# Patient Record
Sex: FEMALE | Race: WHITE | Employment: PART TIME | URBAN - METROPOLITAN AREA
[De-identification: names, ages, dates, MRNs, and addresses within clinical notes are randomized per-mention and may not be internally consistent; named-entity substitution may affect disease eponyms.]

---

## 2023-03-11 ENCOUNTER — HOSPITAL ENCOUNTER (EMERGENCY)
Age: 38
Discharge: HOME | End: 2023-03-11
Attending: EMERGENCY MEDICINE
Payer: MEDICAID

## 2023-03-11 VITALS
OXYGEN SATURATION: 97 % | RESPIRATION RATE: 20 BRPM | DIASTOLIC BLOOD PRESSURE: 90 MMHG | TEMPERATURE: 99.1 F | BODY MASS INDEX: 28.27 KG/M2 | WEIGHT: 140.21 LBS | HEIGHT: 59 IN | HEART RATE: 106 BPM | SYSTOLIC BLOOD PRESSURE: 158 MMHG

## 2023-03-11 DIAGNOSIS — G44.209 ACUTE NON INTRACTABLE TENSION-TYPE HEADACHE: ICD-10-CM

## 2023-03-11 DIAGNOSIS — R82.90 ABNORMAL URINALYSIS: ICD-10-CM

## 2023-03-11 DIAGNOSIS — R73.9 HYPERGLYCEMIA: ICD-10-CM

## 2023-03-11 DIAGNOSIS — R11.10 ACUTE VOMITING: Primary | ICD-10-CM

## 2023-03-11 DIAGNOSIS — R10.9 ACUTE ABDOMINAL PAIN: ICD-10-CM

## 2023-03-11 LAB
ALBUMIN SERPL-MCNC: 3.8 G/DL (ref 3.5–5)
ALBUMIN/GLOB SERPL: 0.9 (ref 1.1–2.2)
ALP SERPL-CCNC: 138 U/L (ref 45–117)
ALT SERPL-CCNC: 32 U/L (ref 12–78)
ANION GAP SERPL CALC-SCNC: 7 MMOL/L (ref 5–15)
APPEARANCE UR: CLEAR
AST SERPL-CCNC: 17 U/L (ref 15–37)
BACTERIA URNS QL MICRO: ABNORMAL /HPF
BASOPHILS # BLD: 0.1 K/UL (ref 0–0.1)
BASOPHILS NFR BLD: 1 % (ref 0–1)
BILIRUB SERPL-MCNC: 1.5 MG/DL (ref 0.2–1)
BILIRUB UR QL: NEGATIVE
BUN SERPL-MCNC: 8 MG/DL (ref 6–20)
BUN/CREAT SERPL: 12 (ref 12–20)
CALCIUM SERPL-MCNC: 9.3 MG/DL (ref 8.5–10.1)
CHLORIDE SERPL-SCNC: 104 MMOL/L (ref 97–108)
CO2 SERPL-SCNC: 22 MMOL/L (ref 21–32)
COLOR UR: ABNORMAL
CREAT SERPL-MCNC: 0.66 MG/DL (ref 0.55–1.02)
DIFFERENTIAL METHOD BLD: ABNORMAL
EOSINOPHIL # BLD: 0.2 K/UL (ref 0–0.4)
EOSINOPHIL NFR BLD: 2 % (ref 0–7)
EPITH CASTS URNS QL MICRO: ABNORMAL /LPF
ERYTHROCYTE [DISTWIDTH] IN BLOOD BY AUTOMATED COUNT: 12.5 % (ref 11.5–14.5)
GLOBULIN SER CALC-MCNC: 4.3 G/DL (ref 2–4)
GLUCOSE SERPL-MCNC: 303 MG/DL (ref 65–100)
GLUCOSE UR STRIP.AUTO-MCNC: >1000 MG/DL
HCG UR QL: NEGATIVE
HCT VFR BLD AUTO: 39.5 % (ref 35–47)
HGB BLD-MCNC: 13.1 G/DL (ref 11.5–16)
HGB UR QL STRIP: NEGATIVE
HYALINE CASTS URNS QL MICRO: ABNORMAL /LPF (ref 0–5)
IMM GRANULOCYTES # BLD AUTO: 0 K/UL (ref 0–0.04)
IMM GRANULOCYTES NFR BLD AUTO: 0 % (ref 0–0.5)
KETONES UR QL STRIP.AUTO: 40 MG/DL
LEUKOCYTE ESTERASE UR QL STRIP.AUTO: NEGATIVE
LIPASE SERPL-CCNC: 84 U/L (ref 73–393)
LYMPHOCYTES # BLD: 2.4 K/UL (ref 0.8–3.5)
LYMPHOCYTES NFR BLD: 20 % (ref 12–49)
MCH RBC QN AUTO: 26.8 PG (ref 26–34)
MCHC RBC AUTO-ENTMCNC: 33.2 G/DL (ref 30–36.5)
MCV RBC AUTO: 80.9 FL (ref 80–99)
MONOCYTES # BLD: 0.8 K/UL (ref 0–1)
MONOCYTES NFR BLD: 7 % (ref 5–13)
NEUTS SEG # BLD: 8.8 K/UL (ref 1.8–8)
NEUTS SEG NFR BLD: 70 % (ref 32–75)
NITRITE UR QL STRIP.AUTO: NEGATIVE
NRBC # BLD: 0 K/UL (ref 0–0.01)
NRBC BLD-RTO: 0 PER 100 WBC
PH UR STRIP: 5.5 (ref 5–8)
PLATELET # BLD AUTO: 406 K/UL (ref 150–400)
PMV BLD AUTO: 9.6 FL (ref 8.9–12.9)
POTASSIUM SERPL-SCNC: 4 MMOL/L (ref 3.5–5.1)
PROT SERPL-MCNC: 8.1 G/DL (ref 6.4–8.2)
PROT UR STRIP-MCNC: 30 MG/DL
RBC # BLD AUTO: 4.88 M/UL (ref 3.8–5.2)
RBC #/AREA URNS HPF: ABNORMAL /HPF (ref 0–5)
SODIUM SERPL-SCNC: 133 MMOL/L (ref 136–145)
SP GR UR REFRACTOMETRY: 1.03 (ref 1–1.03)
UA: UC IF INDICATED,UAUC: ABNORMAL
UROBILINOGEN UR QL STRIP.AUTO: 0.2 EU/DL (ref 0.2–1)
WBC # BLD AUTO: 12.4 K/UL (ref 3.6–11)
WBC URNS QL MICRO: ABNORMAL /HPF (ref 0–4)

## 2023-03-11 PROCEDURE — 87086 URINE CULTURE/COLONY COUNT: CPT

## 2023-03-11 PROCEDURE — 96361 HYDRATE IV INFUSION ADD-ON: CPT

## 2023-03-11 PROCEDURE — 99284 EMERGENCY DEPT VISIT MOD MDM: CPT

## 2023-03-11 PROCEDURE — 83690 ASSAY OF LIPASE: CPT

## 2023-03-11 PROCEDURE — 81001 URINALYSIS AUTO W/SCOPE: CPT

## 2023-03-11 PROCEDURE — 74011250636 HC RX REV CODE- 250/636: Performed by: EMERGENCY MEDICINE

## 2023-03-11 PROCEDURE — 80053 COMPREHEN METABOLIC PANEL: CPT

## 2023-03-11 PROCEDURE — 85025 COMPLETE CBC W/AUTO DIFF WBC: CPT

## 2023-03-11 PROCEDURE — 74011250637 HC RX REV CODE- 250/637: Performed by: EMERGENCY MEDICINE

## 2023-03-11 PROCEDURE — 87147 CULTURE TYPE IMMUNOLOGIC: CPT

## 2023-03-11 PROCEDURE — 81025 URINE PREGNANCY TEST: CPT

## 2023-03-11 PROCEDURE — 96375 TX/PRO/DX INJ NEW DRUG ADDON: CPT

## 2023-03-11 PROCEDURE — 96374 THER/PROPH/DIAG INJ IV PUSH: CPT

## 2023-03-11 PROCEDURE — 36415 COLL VENOUS BLD VENIPUNCTURE: CPT

## 2023-03-11 RX ORDER — ONDANSETRON 2 MG/ML
4 INJECTION INTRAMUSCULAR; INTRAVENOUS
Status: COMPLETED | OUTPATIENT
Start: 2023-03-11 | End: 2023-03-11

## 2023-03-11 RX ORDER — BUTALBITAL, ACETAMINOPHEN AND CAFFEINE 300; 40; 50 MG/1; MG/1; MG/1
1 CAPSULE ORAL
Qty: 20 CAPSULE | Refills: 0 | Status: SHIPPED | OUTPATIENT
Start: 2023-03-11

## 2023-03-11 RX ORDER — DICYCLOMINE HYDROCHLORIDE 20 MG/1
20 TABLET ORAL EVERY 6 HOURS
Qty: 20 TABLET | Refills: 0 | Status: SHIPPED | OUTPATIENT
Start: 2023-03-11

## 2023-03-11 RX ORDER — ONDANSETRON 4 MG/1
4 TABLET, FILM COATED ORAL
Qty: 20 TABLET | Refills: 0 | Status: SHIPPED | OUTPATIENT
Start: 2023-03-11

## 2023-03-11 RX ORDER — KETOROLAC TROMETHAMINE 30 MG/ML
15 INJECTION, SOLUTION INTRAMUSCULAR; INTRAVENOUS
Status: COMPLETED | OUTPATIENT
Start: 2023-03-11 | End: 2023-03-11

## 2023-03-11 RX ORDER — BUTALBITAL, ACETAMINOPHEN AND CAFFEINE 50; 325; 40 MG/1; MG/1; MG/1
1 TABLET ORAL
Status: COMPLETED | OUTPATIENT
Start: 2023-03-11 | End: 2023-03-11

## 2023-03-11 RX ADMIN — BUTALBITAL, ACETAMINOPHEN, AND CAFFEINE 1 TABLET: 50; 325; 40 TABLET ORAL at 01:39

## 2023-03-11 RX ADMIN — KETOROLAC TROMETHAMINE 15 MG: 30 INJECTION, SOLUTION INTRAMUSCULAR at 00:54

## 2023-03-11 RX ADMIN — ONDANSETRON 4 MG: 2 INJECTION INTRAMUSCULAR; INTRAVENOUS at 00:55

## 2023-03-11 RX ADMIN — SODIUM CHLORIDE 1000 ML: 9 INJECTION, SOLUTION INTRAVENOUS at 00:55

## 2023-03-11 NOTE — ED NOTES
[facility] for the patient to return our call, office number was provided      HUB TO READ: Please provide the patient with the below provider comments.     ----- Message from Fernanda Carreon PA-C sent at 12/7/2021  2:28 PM EST -----  Mammogram looked good, no findings suspicious for malignancy.      ED staff reviewed discharge instructions with patient.

## 2023-03-11 NOTE — ED PROVIDER NOTES
EMERGENCY DEPARTMENT HISTORY AND PHYSICAL EXAM            Please note that this dictation was completed with the assistance of \"Dragon\", the computer voice recognition software. Quite often unanticipated grammatical, syntax, homophones, and other interpretive errors are inadvertently transcribed by the computer software. Please disregard these errors and any errors that have escaped final proofreading. Thank you. Date of Evaluation: 03/11/23  Patient: Phalen Shearouse  Patient Age and Sex: 40 y.o. female   MRN: [id number]  CSN: [id number]  PCP: None    History of Present Illness     Chief Complaint   Patient presents with    Vomiting     Patient ambulatory to triage w c/o vomiting onset a few days ago reports it is now making her head hurt. History Provided By: Patient/family/EMS (if available)    {HPI Limitations (Optional):77396}    HPI: Phalen Shearouse, 40 y.o. female with past medical history as documented below presents to the ED with c/o of ***. Pt denies any other exacerbating or ameliorating factors. There are no other complaints, changes or physical findings pertinent to the HPI at this time. Nursing Notes were all reviewed and agreed with or any disagreements were addressed in the HPI. Past History   Past Medical History:  History reviewed. No pertinent past medical history. Past Surgical History:  History reviewed. No pertinent surgical history. Family History:   Family history reviewed and was non-contributory, unless specified below:  Family History   Problem Relation Age of Onset    Hypertension Mother     Asthma Father     Lung Disease Father         COPD       Social History:  Social History     Tobacco Use    Smoking status: Never       Allergies:  No Known Allergies    Current Medications:  No current facility-administered medications on file prior to encounter. No current outpatient medications on file prior to encounter.        Review of Systems   A complete ROS was reviewed by me today and all other systems negative, unless otherwise specified below:  Review of Systems  Physical Exam   Patient Vitals for the past 24 hrs:   Temp Pulse Resp BP SpO2   03/11/23 0102 99.1 °F (37.3 °C) (!) 106 20 (!) 158/90 97 %   03/11/23 0023 99 °F (37.2 °C) (!) 114 -- (!) 182/111 99 %       Physical Exam  Diagnostic Studies     LABORATORY RESULTS:  I have personally reviewed and interpreted all available laboratory results. Recent Results (from the past 24 hour(s))   CBC WITH AUTOMATED DIFF    Collection Time: 03/11/23 12:36 AM   Result Value Ref Range    WBC 12.4 (H) 3.6 - 11.0 K/uL    RBC 4.88 3.80 - 5.20 M/uL    HGB 13.1 11.5 - 16.0 g/dL    HCT 39.5 35.0 - 47.0 %    MCV 80.9 80.0 - 99.0 FL    MCH 26.8 26.0 - 34.0 PG    MCHC 33.2 30.0 - 36.5 g/dL    RDW 12.5 11.5 - 14.5 %    PLATELET 603 (H) 753 - 400 K/uL    MPV 9.6 8.9 - 12.9 FL    NRBC 0.0 0  WBC    ABSOLUTE NRBC 0.00 0.00 - 0.01 K/uL    NEUTROPHILS 70 32 - 75 %    LYMPHOCYTES 20 12 - 49 %    MONOCYTES 7 5 - 13 %    EOSINOPHILS 2 0 - 7 %    BASOPHILS 1 0 - 1 %    IMMATURE GRANULOCYTES 0 0.0 - 0.5 %    ABS. NEUTROPHILS 8.8 (H) 1.8 - 8.0 K/UL    ABS. LYMPHOCYTES 2.4 0.8 - 3.5 K/UL    ABS. MONOCYTES 0.8 0.0 - 1.0 K/UL    ABS. EOSINOPHILS 0.2 0.0 - 0.4 K/UL    ABS. BASOPHILS 0.1 0.0 - 0.1 K/UL    ABS. IMM. GRANS. 0.0 0.00 - 0.04 K/UL    DF AUTOMATED     METABOLIC PANEL, COMPREHENSIVE    Collection Time: 03/11/23 12:36 AM   Result Value Ref Range    Sodium 133 (L) 136 - 145 mmol/L    Potassium 4.0 3.5 - 5.1 mmol/L    Chloride 104 97 - 108 mmol/L    CO2 22 21 - 32 mmol/L    Anion gap 7 5 - 15 mmol/L    Glucose 303 (H) 65 - 100 mg/dL    BUN 8 6 - 20 MG/DL    Creatinine 0.66 0.55 - 1.02 MG/DL    BUN/Creatinine ratio 12 12 - 20      eGFR >60 >60 ml/min/1.73m2    Calcium 9.3 8.5 - 10.1 MG/DL    Bilirubin, total 1.5 (H) 0.2 - 1.0 MG/DL    ALT (SGPT) 32 12 - 78 U/L    AST (SGOT) 17 15 - 37 U/L    Alk.  phosphatase 138 (H) 45 - 117 U/L Protein, total 8.1 6.4 - 8.2 g/dL    Albumin 3.8 3.5 - 5.0 g/dL    Globulin 4.3 (H) 2.0 - 4.0 g/dL    A-G Ratio 0.9 (L) 1.1 - 2.2     LIPASE    Collection Time: 03/11/23 12:36 AM   Result Value Ref Range    Lipase 84 73 - 393 U/L   URINALYSIS W/ REFLEX CULTURE    Collection Time: 03/11/23 12:36 AM    Specimen: Urine   Result Value Ref Range    Color YELLOW/STRAW      Appearance CLEAR CLEAR      Specific gravity 1.030 1.003 - 1.030      pH (UA) 5.5 5.0 - 8.0      Protein 30 (A) NEG mg/dL    Glucose >1,000 (A) NEG mg/dL    Ketone 40 (A) NEG mg/dL    Bilirubin Negative NEG      Blood Negative NEG      Urobilinogen 0.2 0.2 - 1.0 EU/dL    Nitrites Negative NEG      Leukocyte Esterase Negative NEG      WBC 10-20 0 - 4 /hpf    RBC 0-5 0 - 5 /hpf    Epithelial cells MODERATE (A) FEW /lpf    Bacteria 2+ (A) NEG /hpf    UA:UC IF INDICATED URINE CULTURE ORDERED (A) CNI      Hyaline cast 0-2 0 - 5 /lpf   HCG URINE, QL. - POC    Collection Time: 03/11/23 12:40 AM   Result Value Ref Range    Pregnancy test,urine (POC) Negative NEG         RADIOLOGY RESULTS:  I have personally reviewed and interpreted all available imaging studies and agree with radiology interpretation. No orders to display     CT Results  (Last 48 hours)      None          CXR Results  (Last 48 hours)      None          No orders to display        [address]   I am the first and primary ED physician for this patient's ED visit today. I reviewed our EMR for any past records that may contribute to the patient's current condition, including their past medical, surgical, social and family history. This also includes their most recent ED visits, previous hospitalizations and prior diagnostic data. I have reviewed and summarized the most pertinent findings in my HPI and MDM.     Vital Signs Reviewed:  Patient Vitals for the past 24 hrs:   Temp Pulse Resp BP SpO2   03/11/23 0102 99.1 °F (37.3 °C) (!) 106 20 (!) 158/90 97 %   03/11/23 0023 99 °F (37.2 °C) (!) 114 -- (!) 182/111 99 %     Pulse Oximetry Analysis: ***% on RA with good pleth    Cardiac Monitor:   Rate: *** bpm  The cardiac monitor revealed the following rhythm as interpreted by me: Normal Sinus Rhythm  Cardiac monitoring was ordered to monitor patient for signs of cardiac dysrhythmia, which they are at risk for based on their history and/or risk for cardiovascular disease and/or metabolic abnormalities. EKG interpretation:   Billable EKG reviewed by ED Physician in the absence of a cardiologist: Yes  Rhythm: {[facility] ED EKG RHYTHM:63056}; and {[facility] EKG TUHW:19651}. Rate (approx.): ***; Axis: {[facility] EKG AXIS DEVIATION:64393}; P wave: {[facility] EKG P WAVE:51717}; QRS interval: {[facility] EKG QRS INTERVAL:53984}; ST/T wave: {[facility] EKG ST/T QDJA:27374}; Other findings: {[facility] EKG OTHER KWBTUBUZ:79187}. This EKG was interpreted by Marysol Fatima MD     Records Reviewed: Nursing Notes, Old Medical Records, Previous electrocardiograms, Previous Radiology Studies and Previous Laboratory Studies, EMS reports    DIFFERENTIAL DIAGNOSIS AND MDM:  ***    Pertinent Chronic Medical Conditions or Prior Surgeries: Listed under PMH above and includes:  History reviewed. No pertinent past medical history. ***  Review of Prior Records and External Documents:   reviewed: YES - I have reviewed the patient's controlled substance prescription history thru the Prescription Monitoring Program so that the prescription(s) for a controlled substance can be given. Prior hospital discharge summaries and clinic notes reviewed: ***    Independent History: An independent clinically history was obtained from ***family, EMS, police. They state ***    Social Determinants of Health:  Patients evaluation and management were significantly impacted by social determinants of health.      Social Determinants affecting Dx or Tx: {Social Barriers:21236}    ***  Social History     Socioeconomic History    Marital status: SINGLE   Tobacco Use    Smoking status: Never   Substance and Sexual Activity    Sexual activity: Yes     Birth control/protection: None     ED Course: Progress Notes, Reevaluation, and Consults:  Initial assessment performed. I discussed presenting problems and concerns, and my formulated plan for today's visit with the patient and any available family members. I have encouraged them to ask questions as they arise throughout the visit. ED Physician Orders:   Orders Placed This Encounter    CULTURE, URINE    CBC WITH AUTOMATED DIFF    METABOLIC PANEL, COMPREHENSIVE    LIPASE    URINALYSIS W/ REFLEX CULTURE    HCG URINE, QL. - POC    sodium chloride 0.9 % bolus infusion 1,000 mL    ondansetron (ZOFRAN) injection 4 mg    ketorolac (TORADOL) injection 15 mg    butalbital-acetaminophen-caffeine (FIORICET, ESGIC) -40 mg per tablet 1 Tablet    ondansetron hcl (Zofran) 4 mg tablet    butalbital-acetaminophen-caff (Fioricet) -40 mg per capsule    dicyclomine (BENTYL) 20 mg tablet     ED Medications Given:   Medications   sodium chloride 0.9 % bolus infusion 1,000 mL (0 mL IntraVENous IV Completed 3/11/23 0155)   ondansetron (ZOFRAN) injection 4 mg (4 mg IntraVENous Given 3/11/23 0055)   ketorolac (TORADOL) injection 15 mg (15 mg IntraVENous Given 3/11/23 0054)   butalbital-acetaminophen-caffeine (FIORICET, ESGIC) -40 mg per tablet 1 Tablet (1 Tablet Oral Given 3/11/23 0139)     Pt received IV/IM medications per above and placed on appropriate cardiac/respiratory monitoring due to drug toxicity. ED Physician Interpretation of Test Results:   All results were independently reviewed and interpreted by myself, notably showing:     RADIOLOGY:  Non-plain film images such as CT, ultrasound and MRI are read by the radiologist. Plain radiographic images are visualized and preliminarily interpreted by the ED Provider with the below findings:     Imaging interpreted by me: ***    Interpretation per the Radiologist below, if available at the time of this note:     No orders to display     CT Results  (Last 48 hours)      None          CXR Results  (Last 48 hours)      None          No orders to display     My interpretation of EKG: No STEMI. See my EKG interpretation in ED course above. My interpretation of laboratory results:   ***    Amount and/or Complexity of Data Reviewed  HIGH complexity decision making performed   Presentation: ACUTE and SEVERE  Clinical lab tests: ordered as appropriate & reviewed  Tests in the radiology section of CPT®: ordered as appropriate & reviewed  Tests in the medicine section of CPT®: ordered as appropriate & reviewed  Obtain history from someone other than the patient: yes  Review and summarize past medical records: yes  Independent visualization of images, tracings, or specimens: yes    Risks  OTC drugs. Prescription drug management. Parenteral controlled substances. Drug therapy requiring intensive monitoring for toxicity. Decision regarding hospitalization. ***    Progress Note:  I have just re-evaluated the patient. Pt reports improvement of her symptoms after ***. I have reviewed her vital signs and determined there is currently no worsening in their condition or physical exam. Results have been reviewed with them and their questions have been answered. I will continue to review further results as they come available. Progress Note:      Shared Decision Making: I considered performing *** , but did not after shared decision making with patient due to ***. Consideration for admission/observation for: ***. I engaged patient in shared decision making and she feels significant improvement after ED treatment and is comfortable with discharge with outpatient treatment and PCP/Specialist follow-up. ***  FINAL DIAGNOSIS   Clinical Impression:  1. Acute vomiting    2. Acute non intractable tension-type headache    3.  Acute abdominal pain      Attestation:  I am the attending of record for this patient. I personally performed the services described in this documentation on this date, 3/11/2023 for patient, Phalen Shearouse. I have reviewed the chart and verified that the record is accurate and complete.       Adina Cade MD (Electronic Signature) performing neuroimaging, but did not after shared decision making with patient due to lack of red flags or focal findings. No visual disturbances, numbness, focal weakness, or ataxia and has an otherwise non-focal neuro exam. Do not suspect intra-cranial pathology. We also reviewed labs and pt with mildly elevated T bili. Pt without abdominal pain. We decided to defer RUQ imaging and pt will follow-up with her PCP for this. Considered treating for UTI, but urinalysis with moderate epithelials and thus concern for contaminant. Reflex urine culture sent. DISCHARGE  Pt reassessed and symptoms noted to have improved significantly after ED treatment. Phalen Shearouse's labs and imaging have been reviewed with her and available family. She verbally conveys understanding and agreement of the signs, symptoms, diagnosis, treatment and prognosis and additionally agrees to follow up as recommended with Dr. None and/or specialist as instructed. She agrees with the care plan we have created and conveys that all of her questions have been answered. Additionally, I have put together a packet of discharge instructions for her that include: 1) Educational information regarding their diagnosis, 2) How to care for their diagnosis at home, as well a 3) List of reasons why they would want to return to the ED prior to their follow-up appointment should their condition change or symptoms worsen. I have answered all questions to the patient's satisfaction. Strict return precautions given. She and/or family conveyed understanding and agreement with care plan. Vital signs stable for discharge. PLAN  1. Return precautions as discussed with patient and available family/caregiver.      2.   Discharge Medication List as of 3/11/2023  2:20 AM        START taking these medications    Details   ondansetron hcl (Zofran) 4 mg tablet Take 1 Tablet by mouth every eight (8) hours as needed for Nausea or Vomiting., Normal, Disp-20 Tablet, R-0 butalbital-acetaminophen-caff (Fioricet) -40 mg per capsule Take 1 Capsule by mouth every four (4) hours as needed for Headache or Migraine. Indications: tension headache, Normal, Disp-20 Capsule, R-0      dicyclomine (BENTYL) 20 mg tablet Take 1 Tablet by mouth every six (6) hours. , Normal, Disp-20 Tablet, R-0           3. Follow-up Information       Follow up With Specialties Details Why Contact Info    [facility] EMERGENCY DEPT Emergency Medicine  As needed, If symptoms worsen [address] [place] [address] Drive  [address] [facility]  [phone]          Instructed to return to ED if worse  FINAL DIAGNOSIS   Clinical Impression:  1. Acute vomiting    2. Acute non intractable tension-type headache    3. Acute abdominal pain    4. Hyperglycemia    5. Abnormal urinalysis      Attestation:  I am the attending of record for this patient. I personally performed the services described in this documentation on this date, 3/11/2023 for patient, Phalen Shearouse. I have reviewed the chart and verified that the record is accurate and complete.       Alex Linton MD (Electronic Signature)

## 2023-03-11 NOTE — DISCHARGE INSTRUCTIONS
Thank You! It was a pleasure taking care of you in our Emergency Department today. We know that when you come to [facility], you are entrusting us with your health, comfort, and safety. Our physicians and nurses honor that trust, and truly appreciate the opportunity to care for you and your loved ones. We also value your feedback. If you receive a survey about your Emergency Department experience today, please fill it out. We care about our patients' feedback, and we listen to what you have to say. Thank you. Dr. Carrie Torres M.D.      ________________________________________________________________________  I have included a copy of your lab results and/or radiologic studies from today's visit so you can have them easily available at your follow-up visit. We hope you feel better and please do not hesitate to contact the ED if you have any questions at all! Recent Results (from the past 12 hour(s))   CBC WITH AUTOMATED DIFF    Collection Time: 03/11/23 12:36 AM   Result Value Ref Range    WBC 12.4 (H) 3.6 - 11.0 K/uL    RBC 4.88 3.80 - 5.20 M/uL    HGB 13.1 11.5 - 16.0 g/dL    HCT 39.5 35.0 - 47.0 %    MCV 80.9 80.0 - 99.0 FL    MCH 26.8 26.0 - 34.0 PG    MCHC 33.2 30.0 - 36.5 g/dL    RDW 12.5 11.5 - 14.5 %    PLATELET 150 (H) 988 - 400 K/uL    MPV 9.6 8.9 - 12.9 FL    NRBC 0.0 0  WBC    ABSOLUTE NRBC 0.00 0.00 - 0.01 K/uL    NEUTROPHILS 70 32 - 75 %    LYMPHOCYTES 20 12 - 49 %    MONOCYTES 7 5 - 13 %    EOSINOPHILS 2 0 - 7 %    BASOPHILS 1 0 - 1 %    IMMATURE GRANULOCYTES 0 0.0 - 0.5 %    ABS. NEUTROPHILS 8.8 (H) 1.8 - 8.0 K/UL    ABS. LYMPHOCYTES 2.4 0.8 - 3.5 K/UL    ABS. MONOCYTES 0.8 0.0 - 1.0 K/UL    ABS. EOSINOPHILS 0.2 0.0 - 0.4 K/UL    ABS. BASOPHILS 0.1 0.0 - 0.1 K/UL    ABS. IMM.  GRANS. 0.0 0.00 - 0.04 K/UL    DF AUTOMATED     METABOLIC PANEL, COMPREHENSIVE    Collection Time: 03/11/23 12:36 AM   Result Value Ref Range    Sodium 133 (L) 136 - 145 mmol/L    Potassium 4.0 3.5 - 5.1 mmol/L    Chloride 104 97 - 108 mmol/L    CO2 22 21 - 32 mmol/L    Anion gap 7 5 - 15 mmol/L    Glucose 303 (H) 65 - 100 mg/dL    BUN 8 6 - 20 MG/DL    Creatinine 0.66 0.55 - 1.02 MG/DL    BUN/Creatinine ratio 12 12 - 20      eGFR >60 >60 ml/min/1.73m2    Calcium 9.3 8.5 - 10.1 MG/DL    Bilirubin, total 1.5 (H) 0.2 - 1.0 MG/DL    ALT (SGPT) 32 12 - 78 U/L    AST (SGOT) 17 15 - 37 U/L    Alk. phosphatase 138 (H) 45 - 117 U/L    Protein, total 8.1 6.4 - 8.2 g/dL    Albumin 3.8 3.5 - 5.0 g/dL    Globulin 4.3 (H) 2.0 - 4.0 g/dL    A-G Ratio 0.9 (L) 1.1 - 2.2     LIPASE    Collection Time: 03/11/23 12:36 AM   Result Value Ref Range    Lipase 84 73 - 393 U/L   URINALYSIS W/ REFLEX CULTURE    Collection Time: 03/11/23 12:36 AM    Specimen: Urine   Result Value Ref Range    Color YELLOW/STRAW      Appearance CLEAR CLEAR      Specific gravity 1.030 1.003 - 1.030      pH (UA) 5.5 5.0 - 8.0      Protein 30 (A) NEG mg/dL    Glucose >1,000 (A) NEG mg/dL    Ketone 40 (A) NEG mg/dL    Bilirubin Negative NEG      Blood Negative NEG      Urobilinogen 0.2 0.2 - 1.0 EU/dL    Nitrites Negative NEG      Leukocyte Esterase Negative NEG      WBC 10-20 0 - 4 /hpf    RBC 0-5 0 - 5 /hpf    Epithelial cells MODERATE (A) FEW /lpf    Bacteria 2+ (A) NEG /hpf    UA:UC IF INDICATED URINE CULTURE ORDERED (A) CNI      Hyaline cast 0-2 0 - 5 /lpf   HCG URINE, QL. - POC    Collection Time: 03/11/23 12:40 AM   Result Value Ref Range    Pregnancy test,urine (POC) Negative NEG         No orders to display     CT Results  (Last 48 hours)      None          The exam and treatment you received in the Emergency Department were for an urgent problem and are not intended as complete care. It is important that you follow up with a doctor, nurse practitioner, or physician assistant for ongoing care.  If your symptoms become worse or you do not improve as expected and you are unable to reach your usual health care provider, you should return to the Emergency Department. We are available 24 hours a day. Please take your discharge instructions with you when you go to your follow-up appointment. If a prescription has been provided, please have it filled as soon as possible to prevent a delay in treatment. Read the entire medication instruction sheet provided to you by the pharmacy. If you have any questions or reservations about taking the medication due to side effects or interactions with other medications, please call your primary care physician or contact the ER to speak with the charge nurse. Please make an appointment with your family doctor or the physician you were referred to for follow-up of this visit as instructed on your discharge paperwork. Return to the ER if you are unable to be seen or if you are unable to be seen in a timely manner. Should you experience abdominal pain lasting greater than 6 hours, chest pain, difficulty breathing, fever/chills, numbness/tingling, skin changes or other symptoms that concern you, return to the ED sooner. If you feel worse over the next 24 hours, please return to the ED. We are available 24 hours a day. Thank you for trusting us with your care!

## 2023-03-12 LAB
BACTERIA SPEC CULT: ABNORMAL
CC UR VC: ABNORMAL
SERVICE CMNT-IMP: ABNORMAL

## 2023-03-28 PROBLEM — E11.69 HYPERLIPIDEMIA ASSOCIATED WITH TYPE 2 DIABETES MELLITUS (HCC): Status: ACTIVE | Noted: 2023-03-28

## 2023-03-28 PROBLEM — E78.5 HYPERLIPIDEMIA ASSOCIATED WITH TYPE 2 DIABETES MELLITUS (HCC): Status: ACTIVE | Noted: 2023-03-28

## 2023-03-28 PROBLEM — E11.69 TYPE 2 DIABETES MELLITUS WITH OTHER SPECIFIED COMPLICATION (HCC): Status: ACTIVE | Noted: 2023-03-28

## 2023-03-28 PROBLEM — E11.9 HYPERTENSION COMPLICATING DIABETES (HCC): Status: ACTIVE | Noted: 2023-03-28

## 2023-03-28 PROBLEM — I10 HYPERTENSION COMPLICATING DIABETES (HCC): Status: ACTIVE | Noted: 2023-03-28

## 2023-05-24 ENCOUNTER — HOSPITAL ENCOUNTER (EMERGENCY)
Facility: HOSPITAL | Age: 38
Discharge: HOME | End: 2023-05-25
Attending: EMERGENCY MEDICINE
Payer: MEDICAID

## 2023-05-24 VITALS
SYSTOLIC BLOOD PRESSURE: 163 MMHG | BODY MASS INDEX: 27.33 KG/M2 | WEIGHT: 135.58 LBS | RESPIRATION RATE: 16 BRPM | OXYGEN SATURATION: 97 % | DIASTOLIC BLOOD PRESSURE: 111 MMHG | HEIGHT: 59 IN | TEMPERATURE: 98 F | HEART RATE: 114 BPM

## 2023-05-24 DIAGNOSIS — J01.00 ACUTE NON-RECURRENT MAXILLARY SINUSITIS: Primary | ICD-10-CM

## 2023-05-24 PROCEDURE — 96374 THER/PROPH/DIAG INJ IV PUSH: CPT | Performed by: EMERGENCY MEDICINE

## 2023-05-24 PROCEDURE — 6360000002 HC RX W HCPCS: Performed by: EMERGENCY MEDICINE

## 2023-05-24 PROCEDURE — 99284 EMERGENCY DEPT VISIT MOD MDM: CPT | Performed by: EMERGENCY MEDICINE

## 2023-05-24 PROCEDURE — 6370000000 HC RX 637 (ALT 250 FOR IP): Performed by: EMERGENCY MEDICINE

## 2023-05-24 RX ORDER — GLIPIZIDE 10 MG/1
10 TABLET ORAL 2 TIMES DAILY
COMMUNITY
Start: 2023-03-24

## 2023-05-24 RX ORDER — ONDANSETRON 4 MG/1
4 TABLET, FILM COATED ORAL EVERY 8 HOURS PRN
COMMUNITY
Start: 2023-03-11

## 2023-05-24 RX ORDER — LISINOPRIL 20 MG/1
20 TABLET ORAL DAILY
COMMUNITY
Start: 2023-03-23

## 2023-05-24 RX ORDER — BUTALBITAL, ACETAMINOPHEN AND CAFFEINE 300; 40; 50 MG/1; MG/1; MG/1
1 CAPSULE ORAL EVERY 4 HOURS PRN
COMMUNITY
Start: 2023-03-11

## 2023-05-24 RX ORDER — IBUPROFEN 600 MG/1
600 TABLET ORAL EVERY 6 HOURS PRN
Qty: 20 TABLET | Refills: 1 | Status: SHIPPED | OUTPATIENT
Start: 2023-05-24 | End: 2023-06-03

## 2023-05-24 RX ORDER — OXYMETAZOLINE HYDROCHLORIDE 0.05 G/100ML
2 SPRAY NASAL
Status: COMPLETED | OUTPATIENT
Start: 2023-05-24 | End: 2023-05-24

## 2023-05-24 RX ORDER — ROSUVASTATIN CALCIUM 20 MG/1
1 TABLET, COATED ORAL NIGHTLY
COMMUNITY
Start: 2023-03-24

## 2023-05-24 RX ORDER — KETOROLAC TROMETHAMINE 30 MG/ML
30 INJECTION, SOLUTION INTRAMUSCULAR; INTRAVENOUS
Status: COMPLETED | OUTPATIENT
Start: 2023-05-24 | End: 2023-05-24

## 2023-05-24 RX ORDER — DICYCLOMINE HCL 20 MG
20 TABLET ORAL EVERY 6 HOURS
COMMUNITY
Start: 2023-03-11

## 2023-05-24 RX ORDER — DULAGLUTIDE 3 MG/.5ML
3 INJECTION, SOLUTION SUBCUTANEOUS
COMMUNITY
Start: 2023-03-24

## 2023-05-24 RX ADMIN — OXYMETAZOLINE HCL 2 SPRAY: 0.05 SPRAY NASAL at 23:02

## 2023-05-24 RX ADMIN — KETOROLAC TROMETHAMINE 30 MG: 30 INJECTION, SOLUTION INTRAMUSCULAR; INTRAVENOUS at 23:02

## 2023-05-24 ASSESSMENT — PAIN DESCRIPTION - DESCRIPTORS: DESCRIPTORS: PRESSURE

## 2023-05-24 ASSESSMENT — ENCOUNTER SYMPTOMS
SHORTNESS OF BREATH: 0
SINUS PRESSURE: 1
SINUS PAIN: 1
TROUBLE SWALLOWING: 0
COUGH: 0
SORE THROAT: 0

## 2023-05-24 ASSESSMENT — PAIN DESCRIPTION - ORIENTATION: ORIENTATION: MID;ANTERIOR

## 2023-05-24 ASSESSMENT — PAIN DESCRIPTION - LOCATION: LOCATION: HEAD

## 2023-05-24 ASSESSMENT — PAIN SCALES - GENERAL: PAINLEVEL_OUTOF10: 8

## 2023-05-25 NOTE — ED NOTES
Cefuroxime 250mg twice daily was given yesterday at [facility] for sinus infection.  Pt reports getting worse after taking the medication     Shailesh Farfan RN  05/24/23 4204

## 2023-05-25 NOTE — ED PROVIDER NOTES
EMERGENCY DEPARTMENT HISTORY AND PHYSICAL EXAM    Date: 5/24/2023  Patient Name: Shoaib Nolasco  Patient Age and Sex: 41 y.o. female  MRN:  [id number]  CSN:  [id number]    History of Present Illness     Chief Complaint   Patient presents with    Sinusitis     Patient arrives ambulatory to triage with complaint of sinus infection for several days and was seen at [facility] yesterday. Patient was placed on antibiotic yesterday and reports increasing pain and pressure today. Patient tested negative for strep and covid       History Provided By: Patient    Ability to gather history was limited by:     HPI: Phalen Shearouse, 41 y.o. female   Complains of sinus pressure bilaterally for the past few days. She went to an urgent care yesterday and was prescribed cefuroxime antibiotic. Pain is described as pressure-like and burning, moderate severity. She tested negative for Streptococcus and COVID. No reported fevers or headaches. No sore throat. Tobacco Use      Smoking status: Never      Smokeless tobacco: Never     Past History   The patient's medical, surgical, and social history were reviewed by me today. Current Medications:  No current facility-administered medications on file prior to encounter. Current Outpatient Medications on File Prior to Encounter   Medication Sig Dispense Refill    butalbital-APAP-caffeine -40 MG CAPS per capsule Take 1 capsule by mouth every 4 hours as needed      dicyclomine (BENTYL) 20 MG tablet Take 1 tablet by mouth in the morning and 1 tablet at noon and 1 tablet in the evening and 1 tablet before bedtime.       Dulaglutide (TRULICITY) 3 ZT/9.6HU SOPN Inject 3 mg into the skin every 7 days      glipiZIDE (GLUCOTROL) 10 MG tablet Take 1 tablet by mouth 2 times daily      lisinopril (PRINIVIL;ZESTRIL) 20 MG tablet Take 1 tablet by mouth daily      metFORMIN (GLUCOPHAGE) 1000 MG tablet Take 1 tablet by mouth 2 times daily (with meals)      ondansetron (ZOFRAN) 4

## 2023-05-25 NOTE — ED NOTES
Discharge instructions given to patient by MD Burgess Dc. Verbalized understanding of instructions. Patient discharged without difficulty. Patient discharged in stable condition via ambulation accompanied by self.        Robyn Stevenson RN  05/25/23 0672

## 2025-01-07 ENCOUNTER — HOSPITAL ENCOUNTER (EMERGENCY)
Facility: HOSPITAL | Age: 40
Discharge: HOME | End: 2025-01-07
Attending: EMERGENCY MEDICINE
Payer: MEDICAID

## 2025-01-07 VITALS
WEIGHT: 131.61 LBS | OXYGEN SATURATION: 97 % | RESPIRATION RATE: 16 BRPM | HEART RATE: 115 BPM | TEMPERATURE: 100.2 F | HEIGHT: 59 IN | BODY MASS INDEX: 26.53 KG/M2 | SYSTOLIC BLOOD PRESSURE: 155 MMHG | DIASTOLIC BLOOD PRESSURE: 83 MMHG

## 2025-01-07 DIAGNOSIS — R11.2 NAUSEA AND VOMITING, UNSPECIFIED VOMITING TYPE: Primary | ICD-10-CM

## 2025-01-07 LAB
ALBUMIN SERPL-MCNC: 3.7 G/DL (ref 3.5–5)
ALBUMIN/GLOB SERPL: 0.8 (ref 1.1–2.2)
ALP SERPL-CCNC: 260 U/L (ref 45–117)
ALT SERPL-CCNC: 75 U/L (ref 12–78)
ANION GAP BLD CALC-SCNC: 7 (ref 10–20)
ANION GAP SERPL CALC-SCNC: 9 MMOL/L (ref 2–12)
APPEARANCE UR: CLEAR
AST SERPL-CCNC: 55 U/L (ref 15–37)
BACTERIA URNS QL MICRO: NEGATIVE /HPF
BASE DEFICIT BLD-SCNC: 1.9 MMOL/L
BASOPHILS # BLD: 0.18 K/UL (ref 0–0.1)
BASOPHILS NFR BLD: 1 % (ref 0–1)
BILIRUB SERPL-MCNC: 2 MG/DL (ref 0.2–1)
BILIRUB UR QL: NEGATIVE
BUN SERPL-MCNC: 10 MG/DL (ref 6–20)
BUN/CREAT SERPL: 13 (ref 12–20)
CA-I BLD-MCNC: 1.18 MMOL/L (ref 1.15–1.33)
CALCIUM SERPL-MCNC: 9.3 MG/DL (ref 8.5–10.1)
CHLORIDE BLD-SCNC: 104 MMOL/L (ref 100–111)
CHLORIDE SERPL-SCNC: 102 MMOL/L (ref 97–108)
CO2 BLD-SCNC: 23 MMOL/L (ref 22–29)
CO2 SERPL-SCNC: 21 MMOL/L (ref 21–32)
COLOR UR: ABNORMAL
CREAT SERPL-MCNC: 0.77 MG/DL (ref 0.55–1.02)
CREAT UR-MCNC: 0.5 MG/DL (ref 0.6–1.3)
DIFFERENTIAL METHOD BLD: ABNORMAL
EOSINOPHIL # BLD: 0 K/UL (ref 0–0.4)
EOSINOPHIL NFR BLD: 0 % (ref 0–7)
EPITH CASTS URNS QL MICRO: ABNORMAL /LPF
ERYTHROCYTE [DISTWIDTH] IN BLOOD BY AUTOMATED COUNT: 12.6 % (ref 11.5–14.5)
FLUAV RNA SPEC QL NAA+PROBE: NOT DETECTED
FLUBV RNA SPEC QL NAA+PROBE: NOT DETECTED
GLOBULIN SER CALC-MCNC: 4.6 G/DL (ref 2–4)
GLUCOSE BLD STRIP.AUTO-MCNC: 387 MG/DL (ref 74–99)
GLUCOSE SERPL-MCNC: 330 MG/DL (ref 65–100)
GLUCOSE UR STRIP.AUTO-MCNC: >1000 MG/DL
HCG UR QL: NEGATIVE
HCO3 BLDA-SCNC: 24 MMOL/L
HCT VFR BLD AUTO: 36.5 % (ref 35–47)
HGB BLD-MCNC: 12.2 G/DL (ref 11.5–16)
HGB UR QL STRIP: ABNORMAL
HYALINE CASTS URNS QL MICRO: ABNORMAL /LPF (ref 0–2)
IMM GRANULOCYTES # BLD AUTO: 0.18 K/UL (ref 0–0.04)
IMM GRANULOCYTES NFR BLD AUTO: 1 % (ref 0–0.5)
KETONES UR QL STRIP.AUTO: >80 MG/DL
LEUKOCYTE ESTERASE UR QL STRIP.AUTO: NEGATIVE
LIPASE SERPL-CCNC: 16 U/L (ref 13–75)
LYMPHOCYTES # BLD: 0.72 K/UL (ref 0.8–3.5)
LYMPHOCYTES NFR BLD: 4 % (ref 12–49)
MCH RBC QN AUTO: 26.8 PG (ref 26–34)
MCHC RBC AUTO-ENTMCNC: 33.4 G/DL (ref 30–36.5)
MCV RBC AUTO: 80 FL (ref 80–99)
MONOCYTES # BLD: 1.09 K/UL (ref 0–1)
MONOCYTES NFR BLD: 6 % (ref 5–13)
NEUTS SEG # BLD: 15.93 K/UL (ref 1.8–8)
NEUTS SEG NFR BLD: 88 % (ref 32–75)
NITRITE UR QL STRIP.AUTO: NEGATIVE
NRBC # BLD: 0 K/UL (ref 0–0.01)
NRBC BLD-RTO: 0 PER 100 WBC
PCO2 BLDV: 41.2 MMHG (ref 41–51)
PH BLDV: 7.36 (ref 7.32–7.42)
PH UR STRIP: 5.5 (ref 5–8)
PLATELET # BLD AUTO: 354 K/UL (ref 150–400)
PMV BLD AUTO: 9.8 FL (ref 8.9–12.9)
PO2 BLDV: <27 MMHG (ref 25–40)
POTASSIUM BLD-SCNC: 5.4 MMOL/L (ref 3.5–5.5)
POTASSIUM SERPL-SCNC: 4.7 MMOL/L (ref 3.5–5.1)
PROT SERPL-MCNC: 8.3 G/DL (ref 6.4–8.2)
PROT UR STRIP-MCNC: 30 MG/DL
RBC # BLD AUTO: 4.56 M/UL (ref 3.8–5.2)
RBC #/AREA URNS HPF: ABNORMAL /HPF (ref 0–5)
RBC MORPH BLD: ABNORMAL
SARS-COV-2 RNA RESP QL NAA+PROBE: NOT DETECTED
SODIUM BLD-SCNC: 134 MMOL/L (ref 136–145)
SODIUM SERPL-SCNC: 132 MMOL/L (ref 136–145)
SOURCE: NORMAL
SP GR UR REFRACTOMETRY: 1.02 (ref 1–1.03)
SPECIMEN SITE: ABNORMAL
URINE CULTURE IF INDICATED: ABNORMAL
UROBILINOGEN UR QL STRIP.AUTO: 0.2 EU/DL (ref 0.2–1)
WBC # BLD AUTO: 18.1 K/UL (ref 3.6–11)
WBC URNS QL MICRO: ABNORMAL /HPF (ref 0–4)

## 2025-01-07 PROCEDURE — 36415 COLL VENOUS BLD VENIPUNCTURE: CPT

## 2025-01-07 PROCEDURE — 6360000002 HC RX W HCPCS: Performed by: EMERGENCY MEDICINE

## 2025-01-07 PROCEDURE — 2580000003 HC RX 258: Performed by: EMERGENCY MEDICINE

## 2025-01-07 PROCEDURE — 81001 URINALYSIS AUTO W/SCOPE: CPT

## 2025-01-07 PROCEDURE — 80053 COMPREHEN METABOLIC PANEL: CPT

## 2025-01-07 PROCEDURE — 99284 EMERGENCY DEPT VISIT MOD MDM: CPT

## 2025-01-07 PROCEDURE — 82947 ASSAY GLUCOSE BLOOD QUANT: CPT

## 2025-01-07 PROCEDURE — 84295 ASSAY OF SERUM SODIUM: CPT

## 2025-01-07 PROCEDURE — 81025 URINE PREGNANCY TEST: CPT

## 2025-01-07 PROCEDURE — 87636 SARSCOV2 & INF A&B AMP PRB: CPT

## 2025-01-07 PROCEDURE — 96374 THER/PROPH/DIAG INJ IV PUSH: CPT

## 2025-01-07 PROCEDURE — 82803 BLOOD GASES ANY COMBINATION: CPT

## 2025-01-07 PROCEDURE — 82330 ASSAY OF CALCIUM: CPT

## 2025-01-07 PROCEDURE — 85025 COMPLETE CBC W/AUTO DIFF WBC: CPT

## 2025-01-07 PROCEDURE — 84132 ASSAY OF SERUM POTASSIUM: CPT

## 2025-01-07 PROCEDURE — 83690 ASSAY OF LIPASE: CPT

## 2025-01-07 RX ORDER — 0.9 % SODIUM CHLORIDE 0.9 %
1000 INTRAVENOUS SOLUTION INTRAVENOUS ONCE
Status: COMPLETED | OUTPATIENT
Start: 2025-01-07 | End: 2025-01-07

## 2025-01-07 RX ORDER — ONDANSETRON 4 MG/1
4 TABLET, ORALLY DISINTEGRATING ORAL 3 TIMES DAILY PRN
Qty: 21 TABLET | Refills: 0 | Status: SHIPPED | OUTPATIENT
Start: 2025-01-07

## 2025-01-07 RX ORDER — ONDANSETRON 2 MG/ML
4 INJECTION INTRAMUSCULAR; INTRAVENOUS ONCE
Status: COMPLETED | OUTPATIENT
Start: 2025-01-07 | End: 2025-01-07

## 2025-01-07 RX ADMIN — SODIUM CHLORIDE 1000 ML: 9 INJECTION, SOLUTION INTRAVENOUS at 18:23

## 2025-01-07 RX ADMIN — ONDANSETRON 4 MG: 2 INJECTION INTRAMUSCULAR; INTRAVENOUS at 18:22

## 2025-01-07 ASSESSMENT — PAIN - FUNCTIONAL ASSESSMENT: PAIN_FUNCTIONAL_ASSESSMENT: 0-10

## 2025-01-07 ASSESSMENT — PAIN SCALES - GENERAL: PAINLEVEL_OUTOF10: 0

## 2025-01-07 ASSESSMENT — PAIN DESCRIPTION - PAIN TYPE: TYPE: ACUTE PAIN

## 2025-01-07 ASSESSMENT — PAIN DESCRIPTION - LOCATION: LOCATION: ABDOMEN

## 2025-01-07 ASSESSMENT — PAIN DESCRIPTION - DESCRIPTORS: DESCRIPTORS: ACHING

## 2025-01-07 ASSESSMENT — PAIN DESCRIPTION - ORIENTATION: ORIENTATION: MID

## 2025-01-08 NOTE — ED PROVIDER NOTES
MEDICATIONS:     Medication List        START taking these medications      ondansetron 4 MG disintegrating tablet  Commonly known as: ZOFRAN-ODT  Take 1 tablet by mouth 3 times daily as needed for Nausea or Vomiting            ASK your doctor about these medications      butalbital-APAP-caffeine -40 MG Caps per capsule     dicyclomine 20 MG tablet  Commonly known as: BENTYL     glipiZIDE 10 MG tablet  Commonly known as: GLUCOTROL     ibuprofen 600 MG tablet  Commonly known as: ADVIL;MOTRIN  Take 1 tablet by mouth every 6 hours as needed for Pain     lisinopril 20 MG tablet  Commonly known as: PRINIVIL;ZESTRIL     metFORMIN 1000 MG tablet  Commonly known as: GLUCOPHAGE     ondansetron 4 MG tablet  Commonly known as: ZOFRAN     rosuvastatin 20 MG tablet  Commonly known as: CRESTOR     SITagliptin 25 MG tablet  Commonly known as: JANUVIA     Trulicity 3 MG/0.5ML Sopn  Generic drug: Dulaglutide               Where to Get Your Medications        These medications were [facility] to [facility] [address] [place] [address] [facility]  - P [phone] - F [phone] [address] [place] [address]      Phone: [phone]   ondansetron 4 MG disintegrating tablet           DISCONTINUED MEDICATIONS:  Current Discharge Medication List          I am the Primary Clinician of Record.   Roberto Freitas DO (electronically signed)    (Please note that parts of this dictation were completed with voice recognition software. Quite often unanticipated grammatical, syntax, homophones, and other interpretive errors are inadvertently transcribed by the computer software. Please disregards these errors. Please excuse any errors that have escaped final proofreading.)          Roberto Freitas DO  01/07/25 2015

## 2025-01-13 ENCOUNTER — APPOINTMENT (OUTPATIENT)
Facility: HOSPITAL | Age: 40
End: 2025-01-13
Payer: MEDICAID

## 2025-01-13 ENCOUNTER — HOSPITAL ENCOUNTER (INPATIENT)
Facility: HOSPITAL | Age: 40
LOS: 3 days | Discharge: HOME | End: 2025-01-16
Attending: EMERGENCY MEDICINE | Admitting: STUDENT IN AN ORGANIZED HEALTH CARE EDUCATION/TRAINING PROGRAM
Payer: MEDICAID

## 2025-01-13 DIAGNOSIS — H72.91 OTITIS MEDIA, SEROUS, TM RUPTURE, RIGHT: ICD-10-CM

## 2025-01-13 DIAGNOSIS — E10.10 DIABETIC KETOACIDOSIS WITHOUT COMA ASSOCIATED WITH TYPE 1 DIABETES MELLITUS (HCC): Primary | ICD-10-CM

## 2025-01-13 DIAGNOSIS — H65.91 OTITIS MEDIA, SEROUS, TM RUPTURE, RIGHT: ICD-10-CM

## 2025-01-13 DIAGNOSIS — E11.65 TYPE 2 DIABETES MELLITUS WITH HYPERGLYCEMIA, WITHOUT LONG-TERM CURRENT USE OF INSULIN (HCC): ICD-10-CM

## 2025-01-13 PROBLEM — E11.10 DKA, TYPE 2, NOT AT GOAL (HCC): Status: ACTIVE | Noted: 2025-01-13

## 2025-01-13 LAB
ALBUMIN SERPL-MCNC: 2.7 G/DL (ref 3.5–5)
ALBUMIN/GLOB SERPL: 0.5 (ref 1.1–2.2)
ALP SERPL-CCNC: 243 U/L (ref 45–117)
ALT SERPL-CCNC: 14 U/L (ref 12–78)
ANION GAP SERPL CALC-SCNC: 20 MMOL/L (ref 2–12)
AST SERPL-CCNC: 5 U/L (ref 15–37)
BASE DEFICIT BLD-SCNC: 12.5 MMOL/L
BASOPHILS # BLD: 0.18 K/UL (ref 0–0.1)
BASOPHILS NFR BLD: 1 % (ref 0–1)
BILIRUB SERPL-MCNC: 0.9 MG/DL (ref 0.2–1)
BUN SERPL-MCNC: 11 MG/DL (ref 6–20)
BUN/CREAT SERPL: 13 (ref 12–20)
CALCIUM SERPL-MCNC: 9.4 MG/DL (ref 8.5–10.1)
CHLORIDE SERPL-SCNC: 97 MMOL/L (ref 97–108)
CO2 SERPL-SCNC: 12 MMOL/L (ref 21–32)
CREAT SERPL-MCNC: 0.85 MG/DL (ref 0.55–1.02)
DIFFERENTIAL METHOD BLD: ABNORMAL
EKG ATRIAL RATE: 119 BPM
EKG DIAGNOSIS: NORMAL
EKG P AXIS: 75 DEGREES
EKG P-R INTERVAL: 132 MS
EKG Q-T INTERVAL: 334 MS
EKG QRS DURATION: 64 MS
EKG QTC CALCULATION (BAZETT): 469 MS
EKG R AXIS: 78 DEGREES
EKG T AXIS: 71 DEGREES
EKG VENTRICULAR RATE: 119 BPM
EOSINOPHIL # BLD: 0 K/UL (ref 0–0.4)
EOSINOPHIL NFR BLD: 0 % (ref 0–7)
ERYTHROCYTE [DISTWIDTH] IN BLOOD BY AUTOMATED COUNT: 12.6 % (ref 11.5–14.5)
GLOBULIN SER CALC-MCNC: 5.1 G/DL (ref 2–4)
GLUCOSE BLD STRIP.AUTO-MCNC: 333 MG/DL (ref 65–117)
GLUCOSE BLD STRIP.AUTO-MCNC: 387 MG/DL (ref 65–117)
GLUCOSE SERPL-MCNC: 423 MG/DL (ref 65–100)
HCO3 BLD-SCNC: 12.2 MMOL/L (ref 21–28)
HCT VFR BLD AUTO: 40.4 % (ref 35–47)
HGB BLD-MCNC: 13.4 G/DL (ref 11.5–16)
IMM GRANULOCYTES # BLD AUTO: 0 K/UL (ref 0–0.04)
IMM GRANULOCYTES NFR BLD AUTO: 0 % (ref 0–0.5)
LACTATE BLD-SCNC: 1.56 MMOL/L (ref 0.4–2)
LIPASE SERPL-CCNC: 11 U/L (ref 13–75)
LYMPHOCYTES # BLD: 0.18 K/UL (ref 0.8–3.5)
LYMPHOCYTES NFR BLD: 1 % (ref 12–49)
MCH RBC QN AUTO: 26.7 PG (ref 26–34)
MCHC RBC AUTO-ENTMCNC: 33.2 G/DL (ref 30–36.5)
MCV RBC AUTO: 80.5 FL (ref 80–99)
METAMYELOCYTES NFR BLD MANUAL: 2 %
MONOCYTES # BLD: 1.44 K/UL (ref 0–1)
MONOCYTES NFR BLD: 8 % (ref 5–13)
NEUTS BAND NFR BLD MANUAL: 13 %
NEUTS SEG # BLD: 15.84 K/UL (ref 1.8–8)
NEUTS SEG NFR BLD: 75 % (ref 32–75)
NRBC # BLD: 0 K/UL (ref 0–0.01)
NRBC BLD-RTO: 0 PER 100 WBC
PCO2 BLD: 24.6 MMHG (ref 35–48)
PH BLD: 7.3 (ref 7.35–7.45)
PLATELET # BLD AUTO: 406 K/UL (ref 150–400)
PMV BLD AUTO: 9.4 FL (ref 8.9–12.9)
PO2 BLD: 31 MMHG (ref 83–108)
POTASSIUM SERPL-SCNC: 4 MMOL/L (ref 3.5–5.1)
PROT SERPL-MCNC: 7.8 G/DL (ref 6.4–8.2)
RBC # BLD AUTO: 5.02 M/UL (ref 3.8–5.2)
RBC MORPH BLD: ABNORMAL
SAO2 % BLD: 55.4 % (ref 92–97)
SERVICE CMNT-IMP: ABNORMAL
SODIUM SERPL-SCNC: 129 MMOL/L (ref 136–145)
SPECIMEN TYPE: ABNORMAL
WBC # BLD AUTO: 18 K/UL (ref 3.6–11)

## 2025-01-13 PROCEDURE — 82803 BLOOD GASES ANY COMBINATION: CPT

## 2025-01-13 PROCEDURE — 83690 ASSAY OF LIPASE: CPT

## 2025-01-13 PROCEDURE — 6360000002 HC RX W HCPCS: Performed by: STUDENT IN AN ORGANIZED HEALTH CARE EDUCATION/TRAINING PROGRAM

## 2025-01-13 PROCEDURE — 99285 EMERGENCY DEPT VISIT HI MDM: CPT

## 2025-01-13 PROCEDURE — 83605 ASSAY OF LACTIC ACID: CPT

## 2025-01-13 PROCEDURE — 71045 X-RAY EXAM CHEST 1 VIEW: CPT

## 2025-01-13 PROCEDURE — 6360000002 HC RX W HCPCS: Performed by: EMERGENCY MEDICINE

## 2025-01-13 PROCEDURE — 82010 KETONE BODYS QUAN: CPT

## 2025-01-13 PROCEDURE — 96375 TX/PRO/DX INJ NEW DRUG ADDON: CPT

## 2025-01-13 PROCEDURE — 2060000000 HC ICU INTERMEDIATE R&B

## 2025-01-13 PROCEDURE — 6370000000 HC RX 637 (ALT 250 FOR IP): Performed by: STUDENT IN AN ORGANIZED HEALTH CARE EDUCATION/TRAINING PROGRAM

## 2025-01-13 PROCEDURE — 36415 COLL VENOUS BLD VENIPUNCTURE: CPT

## 2025-01-13 PROCEDURE — 96374 THER/PROPH/DIAG INJ IV PUSH: CPT

## 2025-01-13 PROCEDURE — 2580000003 HC RX 258: Performed by: EMERGENCY MEDICINE

## 2025-01-13 PROCEDURE — 85025 COMPLETE CBC W/AUTO DIFF WBC: CPT

## 2025-01-13 PROCEDURE — 2580000003 HC RX 258: Performed by: STUDENT IN AN ORGANIZED HEALTH CARE EDUCATION/TRAINING PROGRAM

## 2025-01-13 PROCEDURE — 6360000004 HC RX CONTRAST MEDICATION: Performed by: EMERGENCY MEDICINE

## 2025-01-13 PROCEDURE — 80053 COMPREHEN METABOLIC PANEL: CPT

## 2025-01-13 PROCEDURE — 2500000003 HC RX 250 WO HCPCS: Performed by: EMERGENCY MEDICINE

## 2025-01-13 PROCEDURE — 74177 CT ABD & PELVIS W/CONTRAST: CPT

## 2025-01-13 PROCEDURE — 82962 GLUCOSE BLOOD TEST: CPT

## 2025-01-13 PROCEDURE — 93005 ELECTROCARDIOGRAM TRACING: CPT | Performed by: EMERGENCY MEDICINE

## 2025-01-13 RX ORDER — POLYETHYLENE GLYCOL 3350 17 G/17G
17 POWDER, FOR SOLUTION ORAL DAILY PRN
Status: DISCONTINUED | OUTPATIENT
Start: 2025-01-13 | End: 2025-01-16 | Stop reason: HOSPADM

## 2025-01-13 RX ORDER — SODIUM CHLORIDE 450 MG/100ML
INJECTION, SOLUTION INTRAVENOUS CONTINUOUS
Status: DISCONTINUED | OUTPATIENT
Start: 2025-01-13 | End: 2025-01-13

## 2025-01-13 RX ORDER — POTASSIUM CHLORIDE 7.45 MG/ML
10 INJECTION INTRAVENOUS PRN
Status: DISCONTINUED | OUTPATIENT
Start: 2025-01-13 | End: 2025-01-13

## 2025-01-13 RX ORDER — MAGNESIUM SULFATE IN WATER 40 MG/ML
2000 INJECTION, SOLUTION INTRAVENOUS PRN
Status: CANCELLED | OUTPATIENT
Start: 2025-01-13

## 2025-01-13 RX ORDER — MAGNESIUM SULFATE IN WATER 40 MG/ML
2000 INJECTION, SOLUTION INTRAVENOUS PRN
Status: DISCONTINUED | OUTPATIENT
Start: 2025-01-13 | End: 2025-01-13

## 2025-01-13 RX ORDER — POTASSIUM CHLORIDE 7.45 MG/ML
10 INJECTION INTRAVENOUS PRN
Status: DISCONTINUED | OUTPATIENT
Start: 2025-01-13 | End: 2025-01-14

## 2025-01-13 RX ORDER — DIPHENHYDRAMINE HYDROCHLORIDE 50 MG/ML
25 INJECTION INTRAMUSCULAR; INTRAVENOUS
Status: COMPLETED | OUTPATIENT
Start: 2025-01-13 | End: 2025-01-13

## 2025-01-13 RX ORDER — POTASSIUM CHLORIDE 1500 MG/1
40 TABLET, EXTENDED RELEASE ORAL PRN
Status: CANCELLED | OUTPATIENT
Start: 2025-01-13

## 2025-01-13 RX ORDER — SODIUM CHLORIDE 9 MG/ML
INJECTION, SOLUTION INTRAVENOUS PRN
Status: DISCONTINUED | OUTPATIENT
Start: 2025-01-13 | End: 2025-01-16 | Stop reason: HOSPADM

## 2025-01-13 RX ORDER — DEXTROSE MONOHYDRATE AND SODIUM CHLORIDE 5; .45 G/100ML; G/100ML
INJECTION, SOLUTION INTRAVENOUS CONTINUOUS PRN
Status: DISCONTINUED | OUTPATIENT
Start: 2025-01-13 | End: 2025-01-13

## 2025-01-13 RX ORDER — 0.9 % SODIUM CHLORIDE 0.9 %
1000 INTRAVENOUS SOLUTION INTRAVENOUS ONCE
Status: COMPLETED | OUTPATIENT
Start: 2025-01-13 | End: 2025-01-13

## 2025-01-13 RX ORDER — FAMOTIDINE 20 MG/1
20 TABLET, FILM COATED ORAL 2 TIMES DAILY
Status: DISCONTINUED | OUTPATIENT
Start: 2025-01-13 | End: 2025-01-16 | Stop reason: HOSPADM

## 2025-01-13 RX ORDER — SODIUM CHLORIDE AND POTASSIUM CHLORIDE 150; 450 MG/100ML; MG/100ML
INJECTION, SOLUTION INTRAVENOUS CONTINUOUS
Status: DISCONTINUED | OUTPATIENT
Start: 2025-01-13 | End: 2025-01-14

## 2025-01-13 RX ORDER — ACETAMINOPHEN 325 MG/1
650 TABLET ORAL EVERY 6 HOURS PRN
Status: DISCONTINUED | OUTPATIENT
Start: 2025-01-13 | End: 2025-01-16 | Stop reason: HOSPADM

## 2025-01-13 RX ORDER — ACETAMINOPHEN 650 MG/1
650 SUPPOSITORY RECTAL EVERY 6 HOURS PRN
Status: DISCONTINUED | OUTPATIENT
Start: 2025-01-13 | End: 2025-01-16 | Stop reason: HOSPADM

## 2025-01-13 RX ORDER — DEXTROSE MONOHYDRATE, SODIUM CHLORIDE, AND POTASSIUM CHLORIDE 50; 1.49; 4.5 G/1000ML; G/1000ML; G/1000ML
INJECTION, SOLUTION INTRAVENOUS CONTINUOUS PRN
Status: DISCONTINUED | OUTPATIENT
Start: 2025-01-13 | End: 2025-01-16 | Stop reason: HOSPADM

## 2025-01-13 RX ORDER — METOCLOPRAMIDE HYDROCHLORIDE 5 MG/ML
10 INJECTION INTRAMUSCULAR; INTRAVENOUS ONCE
Status: COMPLETED | OUTPATIENT
Start: 2025-01-13 | End: 2025-01-13

## 2025-01-13 RX ORDER — ENOXAPARIN SODIUM 100 MG/ML
40 INJECTION SUBCUTANEOUS DAILY
Status: DISCONTINUED | OUTPATIENT
Start: 2025-01-14 | End: 2025-01-16 | Stop reason: HOSPADM

## 2025-01-13 RX ORDER — ONDANSETRON 4 MG/1
4 TABLET, ORALLY DISINTEGRATING ORAL EVERY 8 HOURS PRN
Status: DISCONTINUED | OUTPATIENT
Start: 2025-01-13 | End: 2025-01-16 | Stop reason: HOSPADM

## 2025-01-13 RX ORDER — 0.9 % SODIUM CHLORIDE 0.9 %
1000 INTRAVENOUS SOLUTION INTRAVENOUS ONCE
Status: DISCONTINUED | OUTPATIENT
Start: 2025-01-13 | End: 2025-01-13

## 2025-01-13 RX ORDER — SODIUM CHLORIDE 0.9 % (FLUSH) 0.9 %
5-40 SYRINGE (ML) INJECTION PRN
Status: DISCONTINUED | OUTPATIENT
Start: 2025-01-13 | End: 2025-01-16 | Stop reason: HOSPADM

## 2025-01-13 RX ORDER — IOPAMIDOL 755 MG/ML
100 INJECTION, SOLUTION INTRAVASCULAR
Status: COMPLETED | OUTPATIENT
Start: 2025-01-13 | End: 2025-01-13

## 2025-01-13 RX ORDER — SODIUM CHLORIDE 0.9 % (FLUSH) 0.9 %
5-40 SYRINGE (ML) INJECTION EVERY 12 HOURS SCHEDULED
Status: DISCONTINUED | OUTPATIENT
Start: 2025-01-13 | End: 2025-01-16 | Stop reason: HOSPADM

## 2025-01-13 RX ORDER — ONDANSETRON 2 MG/ML
4 INJECTION INTRAMUSCULAR; INTRAVENOUS EVERY 6 HOURS PRN
Status: DISCONTINUED | OUTPATIENT
Start: 2025-01-13 | End: 2025-01-16 | Stop reason: HOSPADM

## 2025-01-13 RX ORDER — DICYCLOMINE HCL 20 MG
20 TABLET ORAL 4 TIMES DAILY PRN
Status: DISCONTINUED | OUTPATIENT
Start: 2025-01-13 | End: 2025-01-16 | Stop reason: HOSPADM

## 2025-01-13 RX ORDER — POTASSIUM CHLORIDE 7.45 MG/ML
10 INJECTION INTRAVENOUS PRN
Status: CANCELLED | OUTPATIENT
Start: 2025-01-13

## 2025-01-13 RX ORDER — KETOROLAC TROMETHAMINE 30 MG/ML
30 INJECTION, SOLUTION INTRAMUSCULAR; INTRAVENOUS
Status: COMPLETED | OUTPATIENT
Start: 2025-01-13 | End: 2025-01-13

## 2025-01-13 RX ORDER — MAGNESIUM SULFATE IN WATER 40 MG/ML
2000 INJECTION, SOLUTION INTRAVENOUS PRN
Status: DISCONTINUED | OUTPATIENT
Start: 2025-01-13 | End: 2025-01-16 | Stop reason: HOSPADM

## 2025-01-13 RX ADMIN — POTASSIUM CHLORIDE AND SODIUM CHLORIDE: 450; 150 INJECTION, SOLUTION INTRAVENOUS at 22:55

## 2025-01-13 RX ADMIN — METOCLOPRAMIDE 10 MG: 5 INJECTION, SOLUTION INTRAMUSCULAR; INTRAVENOUS at 20:20

## 2025-01-13 RX ADMIN — KETOROLAC TROMETHAMINE 30 MG: 30 INJECTION, SOLUTION INTRAMUSCULAR at 20:19

## 2025-01-13 RX ADMIN — SODIUM CHLORIDE 1000 ML: 9 INJECTION, SOLUTION INTRAVENOUS at 21:48

## 2025-01-13 RX ADMIN — SODIUM CHLORIDE 6.5 UNITS/HR: 9 INJECTION, SOLUTION INTRAVENOUS at 22:12

## 2025-01-13 RX ADMIN — WATER 1000 MG: 1 INJECTION INTRAMUSCULAR; INTRAVENOUS; SUBCUTANEOUS at 20:20

## 2025-01-13 RX ADMIN — DIPHENHYDRAMINE HYDROCHLORIDE 25 MG: 50 INJECTION INTRAMUSCULAR; INTRAVENOUS at 20:20

## 2025-01-13 RX ADMIN — SODIUM CHLORIDE 1000 ML: 9 INJECTION, SOLUTION INTRAVENOUS at 20:20

## 2025-01-13 RX ADMIN — FAMOTIDINE 20 MG: 20 TABLET, FILM COATED ORAL at 22:34

## 2025-01-13 RX ADMIN — IOPAMIDOL 100 ML: 755 INJECTION, SOLUTION INTRAVENOUS at 21:06

## 2025-01-13 RX ADMIN — AMPICILLIN SODIUM AND SULBACTAM SODIUM 3000 MG: 2; 1 INJECTION, POWDER, FOR SOLUTION INTRAMUSCULAR; INTRAVENOUS at 22:31

## 2025-01-13 ASSESSMENT — PAIN SCALES - GENERAL
PAINLEVEL_OUTOF10: 7
PAINLEVEL_OUTOF10: 8

## 2025-01-13 ASSESSMENT — PAIN - FUNCTIONAL ASSESSMENT: PAIN_FUNCTIONAL_ASSESSMENT: 0-10

## 2025-01-13 ASSESSMENT — PAIN DESCRIPTION - DESCRIPTORS
DESCRIPTORS: ACHING
DESCRIPTORS: ACHING

## 2025-01-13 ASSESSMENT — PAIN DESCRIPTION - ORIENTATION
ORIENTATION: RIGHT;LEFT;ANTERIOR;POSTERIOR
ORIENTATION: ANTERIOR

## 2025-01-13 ASSESSMENT — PAIN DESCRIPTION - LOCATION
LOCATION: HEAD
LOCATION: HEAD

## 2025-01-13 ASSESSMENT — LIFESTYLE VARIABLES
HOW OFTEN DO YOU HAVE A DRINK CONTAINING ALCOHOL: NEVER
HOW MANY STANDARD DRINKS CONTAINING ALCOHOL DO YOU HAVE ON A TYPICAL DAY: PATIENT DOES NOT DRINK

## 2025-01-14 PROBLEM — E10.10 DIABETIC KETOACIDOSIS WITHOUT COMA ASSOCIATED WITH TYPE 1 DIABETES MELLITUS (HCC): Status: ACTIVE | Noted: 2025-01-14

## 2025-01-14 LAB
ANION GAP SERPL CALC-SCNC: 10 MMOL/L (ref 2–12)
ANION GAP SERPL CALC-SCNC: 10 MMOL/L (ref 2–12)
ANION GAP SERPL CALC-SCNC: 6 MMOL/L (ref 2–12)
ANION GAP SERPL CALC-SCNC: 7 MMOL/L (ref 2–12)
ANION GAP SERPL CALC-SCNC: 8 MMOL/L (ref 2–12)
B-OH-BUTYR SERPL-SCNC: 4.2 MMOL/L
BASOPHILS # BLD: 0 K/UL (ref 0–0.1)
BASOPHILS NFR BLD: 0 % (ref 0–1)
BUN SERPL-MCNC: 12 MG/DL (ref 6–20)
BUN SERPL-MCNC: 5 MG/DL (ref 6–20)
BUN SERPL-MCNC: 6 MG/DL (ref 6–20)
BUN SERPL-MCNC: 7 MG/DL (ref 6–20)
BUN SERPL-MCNC: 9 MG/DL (ref 6–20)
BUN/CREAT SERPL: 12 (ref 12–20)
BUN/CREAT SERPL: 13 (ref 12–20)
BUN/CREAT SERPL: 14 (ref 12–20)
BUN/CREAT SERPL: 15 (ref 12–20)
BUN/CREAT SERPL: 17 (ref 12–20)
CALCIUM SERPL-MCNC: 8 MG/DL (ref 8.5–10.1)
CALCIUM SERPL-MCNC: 8 MG/DL (ref 8.5–10.1)
CALCIUM SERPL-MCNC: 8.1 MG/DL (ref 8.5–10.1)
CALCIUM SERPL-MCNC: 8.1 MG/DL (ref 8.5–10.1)
CALCIUM SERPL-MCNC: 9 MG/DL (ref 8.5–10.1)
CHLORIDE SERPL-SCNC: 105 MMOL/L (ref 97–108)
CHLORIDE SERPL-SCNC: 106 MMOL/L (ref 97–108)
CHLORIDE SERPL-SCNC: 107 MMOL/L (ref 97–108)
CHLORIDE SERPL-SCNC: 107 MMOL/L (ref 97–108)
CHLORIDE SERPL-SCNC: 110 MMOL/L (ref 97–108)
CO2 SERPL-SCNC: 17 MMOL/L (ref 21–32)
CO2 SERPL-SCNC: 17 MMOL/L (ref 21–32)
CO2 SERPL-SCNC: 18 MMOL/L (ref 21–32)
CO2 SERPL-SCNC: 19 MMOL/L (ref 21–32)
CO2 SERPL-SCNC: 20 MMOL/L (ref 21–32)
COMMENT:: NORMAL
CREAT SERPL-MCNC: 0.42 MG/DL (ref 0.55–1.02)
CREAT SERPL-MCNC: 0.42 MG/DL (ref 0.55–1.02)
CREAT SERPL-MCNC: 0.52 MG/DL (ref 0.55–1.02)
CREAT SERPL-MCNC: 0.54 MG/DL (ref 0.55–1.02)
CREAT SERPL-MCNC: 0.81 MG/DL (ref 0.55–1.02)
DIFFERENTIAL METHOD BLD: ABNORMAL
EOSINOPHIL # BLD: 1.14 K/UL (ref 0–0.4)
EOSINOPHIL NFR BLD: 6 % (ref 0–7)
ERYTHROCYTE [DISTWIDTH] IN BLOOD BY AUTOMATED COUNT: 12.7 % (ref 11.5–14.5)
EST. AVERAGE GLUCOSE BLD GHB EST-MCNC: 338 MG/DL
GLUCOSE BLD STRIP.AUTO-MCNC: 135 MG/DL (ref 65–117)
GLUCOSE BLD STRIP.AUTO-MCNC: 142 MG/DL (ref 65–117)
GLUCOSE BLD STRIP.AUTO-MCNC: 148 MG/DL (ref 65–117)
GLUCOSE BLD STRIP.AUTO-MCNC: 151 MG/DL (ref 65–117)
GLUCOSE BLD STRIP.AUTO-MCNC: 152 MG/DL (ref 65–117)
GLUCOSE BLD STRIP.AUTO-MCNC: 155 MG/DL (ref 65–117)
GLUCOSE BLD STRIP.AUTO-MCNC: 163 MG/DL (ref 65–117)
GLUCOSE BLD STRIP.AUTO-MCNC: 164 MG/DL (ref 65–117)
GLUCOSE BLD STRIP.AUTO-MCNC: 167 MG/DL (ref 65–117)
GLUCOSE BLD STRIP.AUTO-MCNC: 171 MG/DL (ref 65–117)
GLUCOSE BLD STRIP.AUTO-MCNC: 174 MG/DL (ref 65–117)
GLUCOSE BLD STRIP.AUTO-MCNC: 175 MG/DL (ref 65–117)
GLUCOSE BLD STRIP.AUTO-MCNC: 179 MG/DL (ref 65–117)
GLUCOSE BLD STRIP.AUTO-MCNC: 180 MG/DL (ref 65–117)
GLUCOSE BLD STRIP.AUTO-MCNC: 181 MG/DL (ref 65–117)
GLUCOSE BLD STRIP.AUTO-MCNC: 189 MG/DL (ref 65–117)
GLUCOSE BLD STRIP.AUTO-MCNC: 193 MG/DL (ref 65–117)
GLUCOSE BLD STRIP.AUTO-MCNC: 194 MG/DL (ref 65–117)
GLUCOSE BLD STRIP.AUTO-MCNC: 195 MG/DL (ref 65–117)
GLUCOSE BLD STRIP.AUTO-MCNC: 203 MG/DL (ref 65–117)
GLUCOSE BLD STRIP.AUTO-MCNC: 221 MG/DL (ref 65–117)
GLUCOSE BLD STRIP.AUTO-MCNC: 221 MG/DL (ref 65–117)
GLUCOSE BLD STRIP.AUTO-MCNC: 231 MG/DL (ref 65–117)
GLUCOSE BLD STRIP.AUTO-MCNC: 265 MG/DL (ref 65–117)
GLUCOSE SERPL-MCNC: 141 MG/DL (ref 65–100)
GLUCOSE SERPL-MCNC: 170 MG/DL (ref 65–100)
GLUCOSE SERPL-MCNC: 195 MG/DL (ref 65–100)
GLUCOSE SERPL-MCNC: 224 MG/DL (ref 65–100)
GLUCOSE SERPL-MCNC: 242 MG/DL (ref 65–100)
HBA1C MFR BLD: 13.4 % (ref 4–5.6)
HCT VFR BLD AUTO: 31 % (ref 35–47)
HGB BLD-MCNC: 10.2 G/DL (ref 11.5–16)
IMM GRANULOCYTES # BLD AUTO: 0 K/UL (ref 0–0.04)
IMM GRANULOCYTES NFR BLD AUTO: 0 % (ref 0–0.5)
LYMPHOCYTES # BLD: 0.76 K/UL (ref 0.8–3.5)
LYMPHOCYTES NFR BLD: 4 % (ref 12–49)
MAGNESIUM SERPL-MCNC: 1.7 MG/DL (ref 1.6–2.4)
MAGNESIUM SERPL-MCNC: 1.9 MG/DL (ref 1.6–2.4)
MAGNESIUM SERPL-MCNC: 2.1 MG/DL (ref 1.6–2.4)
MAGNESIUM SERPL-MCNC: 2.2 MG/DL (ref 1.6–2.4)
MAGNESIUM SERPL-MCNC: 2.2 MG/DL (ref 1.6–2.4)
MCH RBC QN AUTO: 26.3 PG (ref 26–34)
MCHC RBC AUTO-ENTMCNC: 32.9 G/DL (ref 30–36.5)
MCV RBC AUTO: 79.9 FL (ref 80–99)
MONOCYTES # BLD: 1.33 K/UL (ref 0–1)
MONOCYTES NFR BLD: 7 % (ref 5–13)
NEUTS BAND NFR BLD MANUAL: 11 %
NEUTS SEG # BLD: 15.77 K/UL (ref 1.8–8)
NEUTS SEG NFR BLD: 72 % (ref 32–75)
NRBC # BLD: 0 K/UL (ref 0–0.01)
NRBC BLD-RTO: 0 PER 100 WBC
PHOSPHATE SERPL-MCNC: 0.8 MG/DL (ref 2.6–4.7)
PHOSPHATE SERPL-MCNC: 1 MG/DL (ref 2.6–4.7)
PHOSPHATE SERPL-MCNC: 1.1 MG/DL (ref 2.6–4.7)
PHOSPHATE SERPL-MCNC: 1.3 MG/DL (ref 2.6–4.7)
PHOSPHATE SERPL-MCNC: 1.7 MG/DL (ref 2.6–4.7)
PLATELET # BLD AUTO: 352 K/UL (ref 150–400)
PMV BLD AUTO: 9.7 FL (ref 8.9–12.9)
POTASSIUM SERPL-SCNC: 3.2 MMOL/L (ref 3.5–5.1)
POTASSIUM SERPL-SCNC: 3.2 MMOL/L (ref 3.5–5.1)
POTASSIUM SERPL-SCNC: 3.4 MMOL/L (ref 3.5–5.1)
POTASSIUM SERPL-SCNC: 3.5 MMOL/L (ref 3.5–5.1)
POTASSIUM SERPL-SCNC: 3.7 MMOL/L (ref 3.5–5.1)
RBC # BLD AUTO: 3.88 M/UL (ref 3.8–5.2)
RBC MORPH BLD: ABNORMAL
SERVICE CMNT-IMP: ABNORMAL
SODIUM SERPL-SCNC: 131 MMOL/L (ref 136–145)
SODIUM SERPL-SCNC: 133 MMOL/L (ref 136–145)
SODIUM SERPL-SCNC: 133 MMOL/L (ref 136–145)
SODIUM SERPL-SCNC: 134 MMOL/L (ref 136–145)
SODIUM SERPL-SCNC: 136 MMOL/L (ref 136–145)
SPECIMEN HOLD: NORMAL
WBC # BLD AUTO: 19 K/UL (ref 3.6–11)

## 2025-01-14 PROCEDURE — 82962 GLUCOSE BLOOD TEST: CPT

## 2025-01-14 PROCEDURE — 2060000000 HC ICU INTERMEDIATE R&B

## 2025-01-14 PROCEDURE — 85025 COMPLETE CBC W/AUTO DIFF WBC: CPT

## 2025-01-14 PROCEDURE — 36415 COLL VENOUS BLD VENIPUNCTURE: CPT

## 2025-01-14 PROCEDURE — 2500000003 HC RX 250 WO HCPCS: Performed by: STUDENT IN AN ORGANIZED HEALTH CARE EDUCATION/TRAINING PROGRAM

## 2025-01-14 PROCEDURE — 6360000002 HC RX W HCPCS: Performed by: INTERNAL MEDICINE

## 2025-01-14 PROCEDURE — 83036 HEMOGLOBIN GLYCOSYLATED A1C: CPT

## 2025-01-14 PROCEDURE — 83735 ASSAY OF MAGNESIUM: CPT

## 2025-01-14 PROCEDURE — 80048 BASIC METABOLIC PNL TOTAL CA: CPT

## 2025-01-14 PROCEDURE — 6370000000 HC RX 637 (ALT 250 FOR IP): Performed by: STUDENT IN AN ORGANIZED HEALTH CARE EDUCATION/TRAINING PROGRAM

## 2025-01-14 PROCEDURE — 6360000002 HC RX W HCPCS: Performed by: STUDENT IN AN ORGANIZED HEALTH CARE EDUCATION/TRAINING PROGRAM

## 2025-01-14 PROCEDURE — 6370000000 HC RX 637 (ALT 250 FOR IP)

## 2025-01-14 PROCEDURE — 99221 1ST HOSP IP/OBS SF/LOW 40: CPT

## 2025-01-14 PROCEDURE — 2580000003 HC RX 258: Performed by: STUDENT IN AN ORGANIZED HEALTH CARE EDUCATION/TRAINING PROGRAM

## 2025-01-14 PROCEDURE — 84100 ASSAY OF PHOSPHORUS: CPT

## 2025-01-14 RX ORDER — INSULIN LISPRO 100 [IU]/ML
0-8 INJECTION, SOLUTION INTRAVENOUS; SUBCUTANEOUS
Status: DISCONTINUED | OUTPATIENT
Start: 2025-01-14 | End: 2025-01-16 | Stop reason: HOSPADM

## 2025-01-14 RX ORDER — INSULIN GLARGINE 100 [IU]/ML
15 INJECTION, SOLUTION SUBCUTANEOUS ONCE
Status: COMPLETED | OUTPATIENT
Start: 2025-01-14 | End: 2025-01-14

## 2025-01-14 RX ORDER — MAGNESIUM SULFATE 1 G/100ML
1000 INJECTION INTRAVENOUS ONCE
Status: COMPLETED | OUTPATIENT
Start: 2025-01-14 | End: 2025-01-14

## 2025-01-14 RX ORDER — PROCHLORPERAZINE EDISYLATE 5 MG/ML
10 INJECTION INTRAMUSCULAR; INTRAVENOUS EVERY 6 HOURS PRN
Status: DISCONTINUED | OUTPATIENT
Start: 2025-01-14 | End: 2025-01-16 | Stop reason: HOSPADM

## 2025-01-14 RX ADMIN — AMPICILLIN SODIUM AND SULBACTAM SODIUM 3000 MG: 2; 1 INJECTION, POWDER, FOR SOLUTION INTRAMUSCULAR; INTRAVENOUS at 08:48

## 2025-01-14 RX ADMIN — SODIUM CHLORIDE 8.2 UNITS/HR: 9 INJECTION, SOLUTION INTRAVENOUS at 00:19

## 2025-01-14 RX ADMIN — INSULIN GLARGINE 15 UNITS: 100 INJECTION, SOLUTION SUBCUTANEOUS at 10:13

## 2025-01-14 RX ADMIN — POTASSIUM CHLORIDE, DEXTROSE MONOHYDRATE AND SODIUM CHLORIDE: 150; 5; 450 INJECTION, SOLUTION INTRAVENOUS at 13:47

## 2025-01-14 RX ADMIN — POTASSIUM CHLORIDE AND SODIUM CHLORIDE: 450; 150 INJECTION, SOLUTION INTRAVENOUS at 05:25

## 2025-01-14 RX ADMIN — SODIUM CHLORIDE, PRESERVATIVE FREE 10 ML: 5 INJECTION INTRAVENOUS at 20:41

## 2025-01-14 RX ADMIN — SODIUM PHOSPHATE, MONOBASIC, MONOHYDRATE AND SODIUM PHOSPHATE, DIBASIC, ANHYDROUS 30 MMOL: 276; 142 INJECTION, SOLUTION INTRAVENOUS at 09:19

## 2025-01-14 RX ADMIN — AMPICILLIN SODIUM AND SULBACTAM SODIUM 3000 MG: 2; 1 INJECTION, POWDER, FOR SOLUTION INTRAMUSCULAR; INTRAVENOUS at 16:17

## 2025-01-14 RX ADMIN — FAMOTIDINE 20 MG: 20 TABLET, FILM COATED ORAL at 20:39

## 2025-01-14 RX ADMIN — SODIUM CHLORIDE, PRESERVATIVE FREE 10 ML: 5 INJECTION INTRAVENOUS at 08:46

## 2025-01-14 RX ADMIN — ONDANSETRON 4 MG: 2 INJECTION INTRAMUSCULAR; INTRAVENOUS at 08:46

## 2025-01-14 RX ADMIN — POTASSIUM CHLORIDE, DEXTROSE MONOHYDRATE AND SODIUM CHLORIDE: 150; 5; 450 INJECTION, SOLUTION INTRAVENOUS at 21:59

## 2025-01-14 RX ADMIN — PROCHLORPERAZINE EDISYLATE 10 MG: 5 INJECTION INTRAMUSCULAR; INTRAVENOUS at 20:39

## 2025-01-14 RX ADMIN — AMPICILLIN SODIUM AND SULBACTAM SODIUM 3000 MG: 2; 1 INJECTION, POWDER, FOR SOLUTION INTRAMUSCULAR; INTRAVENOUS at 03:46

## 2025-01-14 RX ADMIN — FAMOTIDINE 20 MG: 20 TABLET, FILM COATED ORAL at 08:48

## 2025-01-14 RX ADMIN — ACETAMINOPHEN 650 MG: 325 TABLET ORAL at 11:16

## 2025-01-14 RX ADMIN — AMPICILLIN SODIUM AND SULBACTAM SODIUM 3000 MG: 2; 1 INJECTION, POWDER, FOR SOLUTION INTRAMUSCULAR; INTRAVENOUS at 21:32

## 2025-01-14 RX ADMIN — PROCHLORPERAZINE EDISYLATE 10 MG: 5 INJECTION INTRAMUSCULAR; INTRAVENOUS at 11:41

## 2025-01-14 RX ADMIN — POTASSIUM CHLORIDE, DEXTROSE MONOHYDRATE AND SODIUM CHLORIDE: 150; 5; 450 INJECTION, SOLUTION INTRAVENOUS at 06:07

## 2025-01-14 RX ADMIN — ONDANSETRON 4 MG: 2 INJECTION INTRAMUSCULAR; INTRAVENOUS at 02:28

## 2025-01-14 RX ADMIN — SODIUM CHLORIDE 6.2 UNITS/HR: 9 INJECTION, SOLUTION INTRAVENOUS at 16:15

## 2025-01-14 RX ADMIN — ENOXAPARIN SODIUM 40 MG: 100 INJECTION SUBCUTANEOUS at 08:48

## 2025-01-14 RX ADMIN — ACETAMINOPHEN 650 MG: 325 TABLET ORAL at 18:18

## 2025-01-14 RX ADMIN — MAGNESIUM SULFATE HEPTAHYDRATE 1000 MG: 1 INJECTION, SOLUTION INTRAVENOUS at 07:45

## 2025-01-14 RX ADMIN — ONDANSETRON 4 MG: 2 INJECTION INTRAMUSCULAR; INTRAVENOUS at 18:10

## 2025-01-14 ASSESSMENT — PAIN SCALES - GENERAL
PAINLEVEL_OUTOF10: 0
PAINLEVEL_OUTOF10: 3
PAINLEVEL_OUTOF10: 0
PAINLEVEL_OUTOF10: 0
PAINLEVEL_OUTOF10: 3
PAINLEVEL_OUTOF10: 0

## 2025-01-14 ASSESSMENT — PAIN SCALES - WONG BAKER
WONGBAKER_NUMERICALRESPONSE: NO HURT

## 2025-01-14 ASSESSMENT — PAIN DESCRIPTION - DESCRIPTORS
DESCRIPTORS: ACHING
DESCRIPTORS: ACHING

## 2025-01-14 ASSESSMENT — PAIN DESCRIPTION - LOCATION
LOCATION: HEAD
LOCATION: HEAD

## 2025-01-14 ASSESSMENT — PAIN - FUNCTIONAL ASSESSMENT
PAIN_FUNCTIONAL_ASSESSMENT: ACTIVITIES ARE NOT PREVENTED
PAIN_FUNCTIONAL_ASSESSMENT: ACTIVITIES ARE NOT PREVENTED

## 2025-01-14 ASSESSMENT — PAIN DESCRIPTION - ORIENTATION
ORIENTATION: ANTERIOR
ORIENTATION: MID

## 2025-01-14 NOTE — PROGRESS NOTES
133* 131*   K 4.0 3.2* 3.7 3.4*   CL 97 107 106 105   CO2 12* 17* 17* 18*   GLUCOSE 423* 242* 141* 224*   BUN 11 12 9 7   CREATININE 0.85 0.81 0.54* 0.52*   CALCIUM 9.4 9.0 8.0* 8.1*   MG  --  1.9 1.7 2.2   PHOS  --  1.1* 0.8* 1.0*   BILITOT 0.9  --   --   --    AST 5*  --   --   --    ALT 14  --   --   --      No results for input(s): \"PHART\", \"SHZ3SWB\", \"PO2ART\", \"EAW7QKF\", \"BEART\", \"LVO1KJNE\", \"HGBART\", \"KT4OXICRR\", \"FIO2A\", \"Z0NQGEVS\", \"OXYHEM\", \"CARBOXHGBART\", \"METHGBART\", \"Y9XBMQIZI\", \"PHCORART\", \"TEMP\" in the last 72 hours.    Invalid input(s): \"DZY3DEKBK\"    24 Hour Results:  Recent Results (from the past 24 hour(s))   EKG 12 Lead    Collection Time: 01/13/25  6:51 PM   Result Value Ref Range    Ventricular Rate 119 BPM    Atrial Rate 119 BPM    P-R Interval 132 ms    QRS Duration 64 ms    Q-T Interval 334 ms    QTc Calculation (Bazett) 469 ms    P Axis 75 degrees    R Axis 78 degrees    T Axis 71 degrees    Diagnosis       Sinus tachycardia  No previous ECGs available  Confirmed by Ibrahima Lee ([id number]) on 1/13/2025 8:21:44 PM     CBC with Auto Differential    Collection Time: 01/13/25  8:13 PM   Result Value Ref Range    WBC 18.0 (H) 3.6 - 11.0 K/uL    RBC 5.02 3.80 - 5.20 M/uL    Hemoglobin 13.4 11.5 - 16.0 g/dL    Hematocrit 40.4 35.0 - 47.0 %    MCV 80.5 80.0 - 99.0 FL    MCH 26.7 26.0 - 34.0 PG    MCHC 33.2 30.0 - 36.5 g/dL    RDW 12.6 11.5 - 14.5 %    Platelets 406 (H) 150 - 400 K/uL    MPV 9.4 8.9 - 12.9 FL    Nucleated RBCs 0.0 0  WBC    nRBC 0.00 0.00 - 0.01 K/uL    Neutrophils % 75 32.0 - 75.0 %    Band Neutrophils 13 %    Lymphocytes % 1 (L) 12.0 - 49.0 %    Monocytes % 8 5.0 - 13.0 %    Eosinophils % 0 0.0 - 7.0 %    Basophils % 1 0.0 - 1.0 %    Metamyelocytes 2 %    Immature Granulocytes % 0 0.0 - 0.5 %    Neutrophils Absolute 15.84 (H) 1.80 - 8.00 K/UL    Lymphocytes Absolute 0.18 (L) 0.80 - 3.50 K/UL    Monocytes Absolute 1.44 (H) 0.00 - 1.00 K/UL    Eosinophils Absolute 0.00 0.00 -  0.40 K/UL    Basophils Absolute 0.18 (H) 0.00 - 0.10 K/UL    Immature Granulocytes Absolute 0.00 0.00 - 0.04 K/UL    Differential Type MANUAL      RBC Comment NORMOCYTIC, NORMOCHROMIC     Comprehensive Metabolic Panel    Collection Time: 01/13/25  8:13 PM   Result Value Ref Range    Sodium 129 (L) 136 - 145 mmol/L    Potassium 4.0 3.5 - 5.1 mmol/L    Chloride 97 97 - 108 mmol/L    CO2 12 (LL) 21 - 32 mmol/L    Anion Gap 20 (H) 2 - 12 mmol/L    Glucose 423 (H) 65 - 100 mg/dL    BUN 11 6 - 20 MG/DL    Creatinine 0.85 0.55 - 1.02 MG/DL    BUN/Creatinine Ratio 13 12 - 20      Est, Glom Filt Rate 90 >60 ml/min/1.73m2    Calcium 9.4 8.5 - 10.1 MG/DL    Total Bilirubin 0.9 0.2 - 1.0 MG/DL    ALT 14 12 - 78 U/L    AST 5 (L) 15 - 37 U/L    Alk Phosphatase 243 (H) 45 - 117 U/L    Total Protein 7.8 6.4 - 8.2 g/dL    Albumin 2.7 (L) 3.5 - 5.0 g/dL    Globulin 5.1 (H) 2.0 - 4.0 g/dL    Albumin/Globulin Ratio 0.5 (L) 1.1 - 2.2     Lipase    Collection Time: 01/13/25  8:13 PM   Result Value Ref Range    Lipase 11 (L) 13 - 75 U/L   Extra Tubes Hold    Collection Time: 01/13/25  9:57 PM   Result Value Ref Range    Specimen HOld lav     Comment:        Add-on orders for these samples will be processed based on acceptable specimen integrity and analyte stability, which may vary by analyte.   POCT Glucose    Collection Time: 01/13/25 10:08 PM   Result Value Ref Range    POC Glucose 387 (H) 65 - 117 mg/dL    Performed by: Bianka Palacios RN    POC CG4:BLOOD GAS,LACTATE    Collection Time: 01/13/25 10:17 PM   Result Value Ref Range    POC pH 7.30 (L) 7.35 - 7.45      POC pCO2 24.6 (L) 35.0 - 48.0 MMHG    POC PO2 31 (LL) 83 - 108 MMHG    POC HCO3 12.2 (L) 21 - 28 MMOL/L    POC O2 SAT 55.4 (L) 92 - 97 %    Base Deficit (POC) 12.5 mmol/L    Specimen type: VENOUS BLOOD      Performed by: Bianka Palacios RN     POC Lactic Acid 1.56 0.40 - 2.00 mmol/L    Critical Value Read Back SOLANO    POCT Glucose    Collection Time: 01/13/25 11:07 PM

## 2025-01-14 NOTE — ED NOTES
Report given to MARIAM Mendoza. Nurse was informed of reason for arrival, vitals, labs, medications, orders, procedures, results, anything left pending and current plan of action. Questions were asked and received prior to departure from the patient.

## 2025-01-14 NOTE — CARE COORDINATION
Care Management Initial Assessment       RUR: 10% Low RUR  Readmission? No  1st IM letter given? No  1st  letter given: No     01/14/25 0854   Service Assessment   Patient Orientation Alert and Oriented;Person;Place;Situation;Self   Cognition Alert   History Provided By Patient   Primary Caregiver Self   Accompanied By/Relationship no one   Support Systems Parent  (Qian Rae (Parent)  [phone])   Patient's Healthcare Decision Maker is: Named in Scanned ACP Document   PCP Verified by CM Yes   Last Visit to PCP Within last 3 months   Prior Functional Level Independent in ADLs/IADLs   Current Functional Level Independent in ADLs/IADLs   Can patient return to prior living arrangement Yes   Ability to make needs known: Good   Family able to assist with home care needs: Yes   Would you like for me to discuss the discharge plan with any other family members/significant others, and if so, who? Yes  (Qian Rae (Parent)  [phone])   Financial Resources Medicaid   Community Resources None   CM/SW Referral Disease Management Education   Social/Functional History   Lives With Parent   Type of Home House   Home Layout One level   Home Access Level entry   Home Equipment None   Active  Yes   Discharge Planning   Type of Residence House   Living Arrangements Parent   Current Services Prior To Admission None   Patient expects to be discharged to: House           The  (CM) conducted an initial meeting with the patient , formally introducing themselves and clarifying their role in discharge planning and transitional care. Demographic and insurance details were verified for accuracy. Notably, the patient has no prior history with home health, skilled nursing facilities (SNF), or inpatient rehabilitation (IPR). Moreover, the patient is alert, able to communicate  needs effectively,  independent in ADLS/IADLS, and able to drive. The patient resides with her family  in a one level and does not  use any DMEs. The patient emergency contact is Butch Qian (Parent) [phone] and denies any needs at this time.    The patient has not been taken her medications because she does not like the way it makes her feel. CM deferred to April Maloney APRN - CNS Clinical Nurse Specialist for diabetes education       Amarjit Dorsey RN  Case Management  [phone]

## 2025-01-14 NOTE — PROGRESS NOTES
Comprehensive Nutrition Assessment    Type and Reason for Visit:  Initial, Positive nutrition screen    Nutrition Recommendations/Plan:   Continue carb controlled diet  Increase Glucerna to BID     Malnutrition Assessment:  Malnutrition Status:  Moderate malnutrition (01/14/25 1027)    Context:  Acute Illness     Findings of the 6 clinical characteristics of malnutrition:  Energy Intake:  75% or less of estimated energy requirements for 7 or more days  Weight Loss:  Greater than 2% over 1 week     Body Fat Loss:  No body fat loss     Muscle Mass Loss:  No muscle mass loss    Fluid Accumulation:  No fluid accumulation     Strength:  Not Performed    Nutrition Assessment:    Patient medically noted for DKA, nausea/vomiting, otitis media, and noncompliance. PMH DM, HTN, HLD. Patient reports decreased appetite and nausea/vomiting over the past week. Off and on solid food intake and she reports ~5-6# weight loss x 1 week. 2.3% weight loss x 1 week noted on chart review. She's tried supplements before; agreeable to continuing Glucerna. Will increase to BID to help with PO intake. She hadn't tried her breakfast this morning; states she was about to but became nauseous so she laid down. Encourage intake of meals as tolerated. Document %PO in flowsheets.     Patient Vitals for the past 120 hrs:   PO Meals Eaten (%)   01/14/25 0748 0%     Nutrition Related Findings:    Na 133, K+ 3.7, -[phone], Phos 0.8, Mg 1.7   Famotidine, Na Phos   Wound Type: None       Current Nutrition Intake & Therapies:    Average Meal Intake: 1-25%     ADULT DIET; Regular; 4 carb choices (60 gm/meal)  ADULT ORAL NUTRITION SUPPLEMENT; Breakfast; Diabetic Oral Supplement    Anthropometric Measures:  Height: 149.9 cm (4' 11\")  Ideal Body Weight (IBW): 95 lbs (43 kg)       Current Body Weight: 58.1 kg (128 lb 1.4 oz),   IBW.    Current BMI (kg/m2): 25.9                             BMI Categories: Overweight (BMI 25.0-29.9)    Estimated  Weight: 58.1 kg (128 lb 1.4 oz),   IBW.    Current BMI (kg/m2): 25.9                             BMI Categories: Overweight (BMI 25.0-29.9)    Estimated Daily Nutrient Needs:  Energy Requirements Based On: Formula  Weight Used for Energy Requirements: Current  Energy (kcal/day): 1517 kcals (BMR x 1.3AF)  Weight Used for Protein Requirements: Current  Protein (g/day): 58-70g (1.0-1.2 g/kg bw)  Method Used for Fluid Requirements: 1 ml/kcal  Fluid (ml/day): 1500 mL    Nutrition Diagnosis:   Moderate malnutrition, in context of acute illness or injury related to inadequate protein-energy intake, nausea/vomiting/diarrhea as evidenced by criteria as identified in malnutrition assessment    Nutrition Interventions:   Food and/or Nutrient Delivery: Continue Current Diet, Modify Oral Nutrition Supplement  Nutrition Education/Counseling: Education/Counseling declined  Coordination of Nutrition Care: Continue to monitor while inpatient       Goals:  Goals: PO intake 50% or greater, PO intake 75% or greater, by next RD assessment          Nutrition Monitoring and Evaluation:   Behavioral-Environmental Outcomes: None Identified  Food/Nutrient Intake Outcomes: Diet Advancement/Tolerance, Food and Nutrient Intake, Supplement Intake  Physical Signs/Symptoms Outcomes: Biochemical Data, Weight, Nausea or Vomiting    Discharge Planning:    Continue current diet, Continue Oral Nutrition Supplement     Lauren Carranza RD  Contact: ext [phone]

## 2025-01-14 NOTE — ED PROVIDER NOTES
[facility] EMERGENCY DEPARTMENT  EMERGENCY DEPARTMENT ENCOUNTER       Pt Name: Phalen Shearouse  MRN: [id number]  Birthdate 1985  Date of evaluation: 1/13/2025  Provider: Roberto Freitas DO   PCP: Lucia Oneill MD  Note Started: 9:11 PM EST 1/13/25     CHIEF COMPLAINT       Chief Complaint   Patient presents with    Headache     Patient BIBEMS complaining of headache and R ear bleeding x1 day. Bleeding has since stopped but headache persists. Patient denies any trauma to the ear.         HISTORY OF PRESENT ILLNESS: 1 or more elements      History From: Patient, History limited by: none     Phalen Shearouse is a 39 y.o. female presents to the emergency department by EMS for evaluation of headache nausea and vomiting.       Please See MDM for Additional Details of the HPI/PMH  Nursing Notes were all reviewed and agreed with or any disagreements were addressed in the HPI.     REVIEW OF SYSTEMS        Positives and Pertinent negatives as per HPI.    PAST HISTORY     Past Medical History:  Past Medical History:   Diagnosis Date    Patient denies medical problems        Past Surgical History:  No past surgical history on file.    Family History:  Family History   Problem Relation Age of Onset    Hypertension Mother     Lung Disease Father         COPD    Asthma Father        Social History:  Social History     Tobacco Use    Smoking status: Never    Smokeless tobacco: Never   Substance Use Topics    Alcohol use: Yes     Alcohol/week: 1.0 standard drink of alcohol    Drug use: Never       Allergies:  No Known Allergies    CURRENT MEDICATIONS      Previous Medications    BUTALBITAL-APAP-CAFFEINE -40 MG CAPS PER CAPSULE    Take 1 capsule by mouth every 4 hours as needed    DICYCLOMINE (BENTYL) 20 MG TABLET    Take 1 tablet by mouth in the morning and 1 tablet at noon and 1 tablet in the evening and 1 tablet before bedtime.    DULAGLUTIDE (TRULICITY) 3 MG/0.5ML SOPN    Inject 3 mg into the skin every 7 days

## 2025-01-14 NOTE — PROGRESS NOTES
End of Shift Note    Bedside shift change report given to Dafne BECERRA  (oncoming nurse) by Kelly Jacob RN (offgoing nurse).  Report included the following information SBAR    Shift worked:  7p-7a     Shift summary and any significant changes:    Pt had 1 episode of N/V.  Anion gap closed x 2   Concerns for physician to address:  See above     Zone phone for oncoming shift:          Activity:     Number times ambulated in hallways past shift: 0  Number of times OOB to chair past shift: 1    Cardiac:   Cardiac Monitoring: Yes           Access:  Current line(s): PIV     Genitourinary:        Respiratory:   O2 Device: None (Room air)  Chronic home O2 use?: NO  Incentive spirometer at bedside: NO    GI:     Current diet:  Diet NPO Exceptions are: Ice Chips, Sips of Water with Meds, Sips of Clear Liquids  Passing flatus: YES    Pain Management:   Patient states pain is manageable on current regimen: YES    Skin:  Milton Scale Score: 21  Interventions:      Patient Safety:  Fall Risk:         Active Consults:   IP CONSULT TO DIABETES EDUCATOR  IP CONSULT TO DIABETES EDUCATOR    Length of Stay:  Expected LOS: 3  Actual LOS: 1    Kelly Jacob RN

## 2025-01-14 NOTE — DIABETES MGMT
[facility]  DIABETES MANAGEMENT CONSULT    Consulted by Provider for advanced nursing evaluation and care for inpatient blood glucose management.    Evaluation and Action Plan   Phalen Shearouse is a 39 year old female patient with a hx of Type 2 diabetes. The patient is admitted after experiencing n/v and headache. The patient is noted to have sought treatment in the ER the previous week for similar symptoms. Upon admission the patient's Bg's were significantly elevated with an elevated anion gap. DKA. The patient was started on the insulin infusion. Her anion gap is now closed x 3. She can likely transition off the insulin infusion this morning, once insulin requirements have lessen. Once using less than 3 units insulin per hour consider transition.     Blood glucose pattern        Management Rationale Action Plan   Medication   Basal needs Using 0.3 units/kg/D based on weight  Use 15 units Lantus dqaily   Nutritional needs Covers carb load in meals Use 4 units Humalog with each meal once consuming 50% or more of meal   Corrective insulin Using medium dose sensitivity based on weight    Additional orders  Carb consistent diet (60g CHO/meal)       Diabetes Discharge Plan   Medication  TBD   Referral  []        Outpatient diabetes education   Additional orders            Initial Presentation   Phalen Shearouse is a 39 y.o. female who presented to the ED on 01/13/25  LAB:Glucose 423. Anion gap 20. Co2 12.   CXR:Narrative & Impression  EXAM: XR CHEST 1 VIEW     INDICATION: n/v, elevated WBC     COMPARISON: None.     FINDINGS: A portable AP radiograph of the chest was obtained at 2039 hours.  Cardiac monitoring leads. The lungs are clear. The cardiac and mediastinal  contours and pulmonary vascularity are normal.  The bones and soft tissues are  grossly within normal limits.     IMPRESSION:  No acute cardiopulmonary process.          CT:Narrative & Impression  EXAM: CT ABDOMEN PELVIS W IV

## 2025-01-14 NOTE — PROGRESS NOTES
0700: Bedside shift change report given to MARIAM Leos (oncoming nurse) by MARIAM Mendoza (offgoing nurse). Report included the following information Nurse Handoff Report, Index, Intake/Output, MAR, and Recent Results.      0900: Dr. Mcgregor at bedside assessing patient. MD made aware that anion gap is closed X2. Will send another BMP and then discuss transitioning Insulin gtt. Verbal orders to add a diet order.     1015: Lantus order placed and given at this time. Will turn Insulin gtt off at 12:15.    1115: Patient is still very nauseas/vomting despite Zofran administration. Dr. Mcgregor messaged to add something else for nausea/vomiting.     1130: Diabetes management at bedside assessing patient. She wants to keep Insulin gtt going until the rate is less than 3. Also, will probably make patient NPO since she is still actively vomiting.     1900: End of Shift Note    Bedside shift change report given to MARIAM Mendoza (oncoming nurse) by Dafne Guerar RN (offgoing nurse).  Report included the following information SBAR, Kardex, Intake/Output, MAR, and Recent Results    Shift worked:  [id number]     Shift summary and any significant changes:     Still on insulin gtt, do not stop until rate is <3.     Concerns for physician to address:  none     Zone phone for oncoming shift:   XXX       Activity:  Level of Assistance: Independent  Number times ambulated in hallways past shift: 0  Number of times OOB to chair past shift: 1    Cardiac:   Cardiac Monitoring: Yes      Cardiac Rhythm: Sinus rhythm    Access:  Current line(s): PIV     Genitourinary:        Respiratory:   O2 Device: None (Room air)  Chronic home O2 use?: NO  Incentive spirometer at bedside: N/A    GI:  Last BM (including prior to admit): 01/12/25  Current diet:  ADULT DIET; Regular; 4 carb choices (60 gm/meal)  ADULT ORAL NUTRITION SUPPLEMENT; Breakfast, Dinner; Diabetic Oral Supplement  Passing flatus: YES    Pain Management:   Patient states pain is manageable on

## 2025-01-14 NOTE — H&P
Hospitalist Admission Note    NAME:  Phalen Shearouse   :  1985   MRN:  [id number]     Date/Time:  2025 9:49 PM    Patient PCP: Lucia Oneill MD    ______________________________________________________________________  Given the patient's current clinical presentation, I have a high level of concern for decompensation if discharged from the emergency department.  Complex decision making was performed, which includes reviewing the patient's available past medical records, laboratory results, and x-ray films.       My assessment of this patient's clinical condition and my plan of care is as follows.    Assessment / Plan:    Active Problems:  New onset DKA and type 2 diabetes mellitus  URI with otitis media status post right TM rupture  Essential hypertension  Hyperlipidemia  Intractable nausea and vomiting  Medication noncompliance    Plan:  New onset DKA and type 2 diabetes mellitus  Admit to stepdown unit  DKA protocol insulin infusion  Check beta hydroxybutyrate  Suspect superimposed component of lactic acidosis and starvation ketosis given intractable nausea vomiting  Check VBG with point-of-care lactic  Diabetes education consulted, greatly appreciate their expertise    URI with otitis media status post right TM rupture  Start Unasyn which can convert to Augmentin to complete course  Recommend outpatient ENT follow-up given TM rupture    Intractable nausea and vomiting  As needed Zofran  Start twice daily Pepcid  Check norovirus  CT abdomen pelvis negative for acute pathology    Essential hypertension  Hyperlipidemia  Medication noncompliance  Patient reports not taking any prescribed medications reporting she did not like the way they made them feel so she stopped taking them  Previously prescribed: Metformin, glipizide, rosuvastatin, Januvia, Trulicity    Medical Decision Making:   I personally reviewed labs: Yes, as listed below  I personally reviewed imaging: CT abdomen pelvis, CXR  Toxic  admit for work up and evaluation of the above problems.     Past Medical History:   Diagnosis Date    Patient denies medical problems         No past surgical history on file.    Social History     Tobacco Use    Smoking status: Never    Smokeless tobacco: Never   Substance Use Topics    Alcohol use: Yes     Alcohol/week: 1.0 standard drink of alcohol        Family History   Problem Relation Age of Onset    Hypertension Mother     Lung Disease Father         COPD    Asthma Father      No Known Allergies     Prior to Admission medications    Medication Sig Start Date End Date Taking? Authorizing Provider   ondansetron (ZOFRAN-ODT) 4 MG disintegrating tablet Take 1 tablet by mouth 3 times daily as needed for Nausea or Vomiting 1/7/25   Roberto Freitas,    butalbital-APAP-caffeine -40 MG CAPS per capsule Take 1 capsule by mouth every 4 hours as needed 3/11/23   Automatic Reconciliation, Ar   dicyclomine (BENTYL) 20 MG tablet Take 1 tablet by mouth in the morning and 1 tablet at noon and 1 tablet in the evening and 1 tablet before bedtime. 3/11/23   Automatic Reconciliation, Ar   Dulaglutide (TRULICITY) 3 MG/0.5ML SOPN Inject 3 mg into the skin every 7 days 3/24/23   Automatic Reconciliation, Ar   glipiZIDE (GLUCOTROL) 10 MG tablet Take 1 tablet by mouth 2 times daily 3/24/23   Automatic Reconciliation, Ar   lisinopril (PRINIVIL;ZESTRIL) 20 MG tablet Take 1 tablet by mouth daily 3/23/23   Automatic Reconciliation, Ar   metFORMIN (GLUCOPHAGE) 1000 MG tablet Take 1 tablet by mouth 2 times daily (with meals) 3/24/23   Automatic Reconciliation, Ar   ondansetron (ZOFRAN) 4 MG tablet Take 1 tablet by mouth every 8 hours as needed 3/11/23   Automatic Reconciliation, Ar   rosuvastatin (CRESTOR) 20 MG tablet Take 1 tablet by mouth nightly 3/24/23   Automatic Reconciliation, Ar   SITagliptin (JANUVIA) 25 MG tablet Take 1 tablet by mouth daily 3/28/23   Automatic Reconciliation, Ar   ibuprofen (ADVIL;MOTRIN) 600 MG tablet

## 2025-01-15 PROBLEM — E11.65 TYPE 2 DIABETES MELLITUS WITH HYPERGLYCEMIA, WITHOUT LONG-TERM CURRENT USE OF INSULIN (HCC): Status: ACTIVE | Noted: 2025-01-15

## 2025-01-15 LAB
ANION GAP SERPL CALC-SCNC: 5 MMOL/L (ref 2–12)
ANION GAP SERPL CALC-SCNC: 7 MMOL/L (ref 2–12)
ANION GAP SERPL CALC-SCNC: 7 MMOL/L (ref 2–12)
BUN SERPL-MCNC: 4 MG/DL (ref 6–20)
BUN SERPL-MCNC: 4 MG/DL (ref 6–20)
BUN SERPL-MCNC: 5 MG/DL (ref 6–20)
BUN/CREAT SERPL: 10 (ref 12–20)
BUN/CREAT SERPL: 11 (ref 12–20)
BUN/CREAT SERPL: 13 (ref 12–20)
CALCIUM SERPL-MCNC: 8 MG/DL (ref 8.5–10.1)
CALCIUM SERPL-MCNC: 8.2 MG/DL (ref 8.5–10.1)
CALCIUM SERPL-MCNC: 8.2 MG/DL (ref 8.5–10.1)
CHLORIDE SERPL-SCNC: 112 MMOL/L (ref 97–108)
CO2 SERPL-SCNC: 19 MMOL/L (ref 21–32)
CO2 SERPL-SCNC: 20 MMOL/L (ref 21–32)
CO2 SERPL-SCNC: 20 MMOL/L (ref 21–32)
CREAT SERPL-MCNC: 0.38 MG/DL (ref 0.55–1.02)
CREAT SERPL-MCNC: 0.38 MG/DL (ref 0.55–1.02)
CREAT SERPL-MCNC: 0.41 MG/DL (ref 0.55–1.02)
EST. AVERAGE GLUCOSE BLD GHB EST-MCNC: ABNORMAL MG/DL
GLUCOSE BLD STRIP.AUTO-MCNC: 129 MG/DL (ref 65–117)
GLUCOSE BLD STRIP.AUTO-MCNC: 135 MG/DL (ref 65–117)
GLUCOSE BLD STRIP.AUTO-MCNC: 159 MG/DL (ref 65–117)
GLUCOSE BLD STRIP.AUTO-MCNC: 159 MG/DL (ref 65–117)
GLUCOSE BLD STRIP.AUTO-MCNC: 160 MG/DL (ref 65–117)
GLUCOSE BLD STRIP.AUTO-MCNC: 164 MG/DL (ref 65–117)
GLUCOSE BLD STRIP.AUTO-MCNC: 165 MG/DL (ref 65–117)
GLUCOSE BLD STRIP.AUTO-MCNC: 166 MG/DL (ref 65–117)
GLUCOSE BLD STRIP.AUTO-MCNC: 168 MG/DL (ref 65–117)
GLUCOSE BLD STRIP.AUTO-MCNC: 169 MG/DL (ref 65–117)
GLUCOSE BLD STRIP.AUTO-MCNC: 170 MG/DL (ref 65–117)
GLUCOSE BLD STRIP.AUTO-MCNC: 170 MG/DL (ref 65–117)
GLUCOSE BLD STRIP.AUTO-MCNC: 179 MG/DL (ref 65–117)
GLUCOSE BLD STRIP.AUTO-MCNC: 180 MG/DL (ref 65–117)
GLUCOSE BLD STRIP.AUTO-MCNC: 198 MG/DL (ref 65–117)
GLUCOSE BLD STRIP.AUTO-MCNC: 218 MG/DL (ref 65–117)
GLUCOSE BLD STRIP.AUTO-MCNC: 231 MG/DL (ref 65–117)
GLUCOSE SERPL-MCNC: 159 MG/DL (ref 65–100)
GLUCOSE SERPL-MCNC: 159 MG/DL (ref 65–100)
GLUCOSE SERPL-MCNC: 165 MG/DL (ref 65–100)
HBA1C MFR BLD: >14 % (ref 4–5.6)
MAGNESIUM SERPL-MCNC: 1.8 MG/DL (ref 1.6–2.4)
MAGNESIUM SERPL-MCNC: 2.1 MG/DL (ref 1.6–2.4)
MAGNESIUM SERPL-MCNC: 2.1 MG/DL (ref 1.6–2.4)
PHOSPHATE SERPL-MCNC: 1.3 MG/DL (ref 2.6–4.7)
PHOSPHATE SERPL-MCNC: 1.7 MG/DL (ref 2.6–4.7)
PHOSPHATE SERPL-MCNC: 1.8 MG/DL (ref 2.6–4.7)
POTASSIUM SERPL-SCNC: 3.3 MMOL/L (ref 3.5–5.1)
POTASSIUM SERPL-SCNC: 3.4 MMOL/L (ref 3.5–5.1)
POTASSIUM SERPL-SCNC: 3.6 MMOL/L (ref 3.5–5.1)
SERVICE CMNT-IMP: ABNORMAL
SODIUM SERPL-SCNC: 137 MMOL/L (ref 136–145)
SODIUM SERPL-SCNC: 138 MMOL/L (ref 136–145)
SODIUM SERPL-SCNC: 139 MMOL/L (ref 136–145)

## 2025-01-15 PROCEDURE — 2500000003 HC RX 250 WO HCPCS: Performed by: STUDENT IN AN ORGANIZED HEALTH CARE EDUCATION/TRAINING PROGRAM

## 2025-01-15 PROCEDURE — 2580000003 HC RX 258: Performed by: INTERNAL MEDICINE

## 2025-01-15 PROCEDURE — 99232 SBSQ HOSP IP/OBS MODERATE 35: CPT

## 2025-01-15 PROCEDURE — 6370000000 HC RX 637 (ALT 250 FOR IP)

## 2025-01-15 PROCEDURE — 6360000002 HC RX W HCPCS: Performed by: NURSE PRACTITIONER

## 2025-01-15 PROCEDURE — 6370000000 HC RX 637 (ALT 250 FOR IP): Performed by: INTERNAL MEDICINE

## 2025-01-15 PROCEDURE — 83735 ASSAY OF MAGNESIUM: CPT

## 2025-01-15 PROCEDURE — 6360000002 HC RX W HCPCS: Performed by: STUDENT IN AN ORGANIZED HEALTH CARE EDUCATION/TRAINING PROGRAM

## 2025-01-15 PROCEDURE — 6370000000 HC RX 637 (ALT 250 FOR IP): Performed by: STUDENT IN AN ORGANIZED HEALTH CARE EDUCATION/TRAINING PROGRAM

## 2025-01-15 PROCEDURE — 80048 BASIC METABOLIC PNL TOTAL CA: CPT

## 2025-01-15 PROCEDURE — 84100 ASSAY OF PHOSPHORUS: CPT

## 2025-01-15 PROCEDURE — 82962 GLUCOSE BLOOD TEST: CPT

## 2025-01-15 PROCEDURE — 2500000003 HC RX 250 WO HCPCS: Performed by: INTERNAL MEDICINE

## 2025-01-15 PROCEDURE — 36415 COLL VENOUS BLD VENIPUNCTURE: CPT

## 2025-01-15 PROCEDURE — 2580000003 HC RX 258: Performed by: STUDENT IN AN ORGANIZED HEALTH CARE EDUCATION/TRAINING PROGRAM

## 2025-01-15 PROCEDURE — 2060000000 HC ICU INTERMEDIATE R&B

## 2025-01-15 RX ORDER — DEXTROSE MONOHYDRATE 100 MG/ML
INJECTION, SOLUTION INTRAVENOUS CONTINUOUS PRN
Status: DISCONTINUED | OUTPATIENT
Start: 2025-01-15 | End: 2025-01-16 | Stop reason: HOSPADM

## 2025-01-15 RX ORDER — INSULIN GLARGINE 100 [IU]/ML
24 INJECTION, SOLUTION SUBCUTANEOUS DAILY
Status: DISCONTINUED | OUTPATIENT
Start: 2025-01-15 | End: 2025-01-16 | Stop reason: HOSPADM

## 2025-01-15 RX ORDER — KETOROLAC TROMETHAMINE 30 MG/ML
15 INJECTION, SOLUTION INTRAMUSCULAR; INTRAVENOUS ONCE
Status: COMPLETED | OUTPATIENT
Start: 2025-01-15 | End: 2025-01-15

## 2025-01-15 RX ORDER — POTASSIUM CHLORIDE 1500 MG/1
40 TABLET, EXTENDED RELEASE ORAL EVERY 4 HOURS
Status: COMPLETED | OUTPATIENT
Start: 2025-01-15 | End: 2025-01-15

## 2025-01-15 RX ORDER — INSULIN GLARGINE 100 [IU]/ML
15 INJECTION, SOLUTION SUBCUTANEOUS DAILY
Status: DISCONTINUED | OUTPATIENT
Start: 2025-01-15 | End: 2025-01-15

## 2025-01-15 RX ORDER — FLUTICASONE PROPIONATE 50 MCG
1 SPRAY, SUSPENSION (ML) NASAL DAILY PRN
Status: DISCONTINUED | OUTPATIENT
Start: 2025-01-15 | End: 2025-01-16 | Stop reason: HOSPADM

## 2025-01-15 RX ORDER — GLUCAGON 1 MG/ML
1 KIT INJECTION PRN
Status: DISCONTINUED | OUTPATIENT
Start: 2025-01-15 | End: 2025-01-16 | Stop reason: HOSPADM

## 2025-01-15 RX ADMIN — SODIUM CHLORIDE, PRESERVATIVE FREE 10 ML: 5 INJECTION INTRAVENOUS at 23:00

## 2025-01-15 RX ADMIN — INSULIN LISPRO 2 UNITS: 100 INJECTION, SOLUTION INTRAVENOUS; SUBCUTANEOUS at 16:31

## 2025-01-15 RX ADMIN — ONDANSETRON 4 MG: 2 INJECTION INTRAMUSCULAR; INTRAVENOUS at 05:25

## 2025-01-15 RX ADMIN — AMPICILLIN SODIUM AND SULBACTAM SODIUM 3000 MG: 2; 1 INJECTION, POWDER, FOR SOLUTION INTRAMUSCULAR; INTRAVENOUS at 23:12

## 2025-01-15 RX ADMIN — INSULIN LISPRO 2 UNITS: 100 INJECTION, SOLUTION INTRAVENOUS; SUBCUTANEOUS at 23:45

## 2025-01-15 RX ADMIN — AMPICILLIN SODIUM AND SULBACTAM SODIUM 3000 MG: 2; 1 INJECTION, POWDER, FOR SOLUTION INTRAMUSCULAR; INTRAVENOUS at 15:12

## 2025-01-15 RX ADMIN — POTASSIUM CHLORIDE 40 MEQ: 1500 TABLET, EXTENDED RELEASE ORAL at 15:50

## 2025-01-15 RX ADMIN — FAMOTIDINE 20 MG: 20 TABLET, FILM COATED ORAL at 23:18

## 2025-01-15 RX ADMIN — AMPICILLIN SODIUM AND SULBACTAM SODIUM 3000 MG: 2; 1 INJECTION, POWDER, FOR SOLUTION INTRAMUSCULAR; INTRAVENOUS at 08:27

## 2025-01-15 RX ADMIN — POTASSIUM CHLORIDE, DEXTROSE MONOHYDRATE AND SODIUM CHLORIDE: 150; 5; 450 INJECTION, SOLUTION INTRAVENOUS at 05:04

## 2025-01-15 RX ADMIN — FAMOTIDINE 20 MG: 20 TABLET, FILM COATED ORAL at 08:27

## 2025-01-15 RX ADMIN — FLUTICASONE PROPIONATE 1 SPRAY: 50 SPRAY, METERED NASAL at 23:48

## 2025-01-15 RX ADMIN — ENOXAPARIN SODIUM 40 MG: 100 INJECTION SUBCUTANEOUS at 08:27

## 2025-01-15 RX ADMIN — SODIUM PHOSPHATE, MONOBASIC, MONOHYDRATE AND SODIUM PHOSPHATE, DIBASIC, ANHYDROUS 30 MMOL: 276; 142 INJECTION, SOLUTION INTRAVENOUS at 09:46

## 2025-01-15 RX ADMIN — POTASSIUM BICARBONATE 40 MEQ: 782 TABLET, EFFERVESCENT ORAL at 23:59

## 2025-01-15 RX ADMIN — KETOROLAC TROMETHAMINE 15 MG: 30 INJECTION, SOLUTION INTRAMUSCULAR at 05:24

## 2025-01-15 RX ADMIN — INSULIN GLARGINE 24 UNITS: 100 INJECTION, SOLUTION SUBCUTANEOUS at 09:44

## 2025-01-15 RX ADMIN — AMPICILLIN SODIUM AND SULBACTAM SODIUM 3000 MG: 2; 1 INJECTION, POWDER, FOR SOLUTION INTRAMUSCULAR; INTRAVENOUS at 03:21

## 2025-01-15 RX ADMIN — SODIUM CHLORIDE, PRESERVATIVE FREE 10 ML: 5 INJECTION INTRAVENOUS at 08:27

## 2025-01-15 ASSESSMENT — PAIN DESCRIPTION - LOCATION: LOCATION: HEAD

## 2025-01-15 ASSESSMENT — PAIN SCALES - GENERAL
PAINLEVEL_OUTOF10: 0
PAINLEVEL_OUTOF10: 0
PAINLEVEL_OUTOF10: 5
PAINLEVEL_OUTOF10: 0

## 2025-01-15 ASSESSMENT — PAIN DESCRIPTION - DESCRIPTORS: DESCRIPTORS: ACHING

## 2025-01-15 NOTE — DIABETES MGMT
PCP  Patient reportedly prescribed insulin in past however patient does not take it.     Lab Results   Component Value Date    LABA1C >14.0 (H) 01/14/2025    LABA1C 13.4 (H) 01/14/2025    LABA1C 12.5 (H) 03/23/2023     Diabetes-related Medical History  Acute complications  DKA      Diabetes Medication History  Diabetes drug class Diabetes drug name Additional Comments   Biguanide  metformin    Sulfonylureas glipizide    DPP4 inhibitors Sitagliptin ( Januvia)    GLP-1 Floridalma Trulicity      Diabetes self-management practices:   Eating pattern   [x] Not eating a carbohydrate-controlled meal plan    Physical activity pattern   [x]  Not employing a physical activity program to control BG    Monitoring pattern   [x]  Not testing BGs sufficiently to inform self-management adjustments      Taking medications pattern  [x]  Inconsistent administration    Reducing risks  [x] Influenza:   10/21/2023     Social determinants of health impacting diabetes self-management practices    Concerned that you need to know more about how to stay healthy with diabetes    Overall evaluation:    [x] Not achieving A1c target with drug therapy & self-care practices    Subjective   ”I learned my lesson\"     Objective   Physical exam  General Overweight  female in no acute distress. Conversant and cooperative  Neuro  Alert, oriented   Vital Signs   Vitals:    01/15/25 1103   BP: 131/83   Pulse: 88   Resp: 17   Temp: 98.3 °F (36.8 °C)   SpO2: 97%       Laboratory  Recent Labs     01/13/25 2013 01/14/25  0456   WBC 18.0* 19.0*   HGB 13.4 10.2*   HCT 40.4 31.0*   MCV 80.5 79.9*   * 352     Recent Labs     01/15/25  0008 01/15/25  0516 01/15/25  0832    138 139   K 3.3* 3.6 3.4*   * 112* 112*   CO2 20* 19* 20*   PHOS 1.3* 1.7* 1.8*   BUN 5* 4* 4*   CREATININE 0.38* 0.38* 0.41*     Lab Results   Component Value Date    ALT 14 01/13/2025    AST 5 (L) 01/13/2025    ALKPHOS 243 (H) 01/13/2025    BILITOT 0.9 01/13/2025     Lab Results    Component Value Date    TSH 1.090 03/23/2023         Factors impacting BG management  Factor Dose Comments   Nutrition:  Standard meals   60 grams/meal    Pain PRN acetaminophen      Infection Unasyn    Kidney function Normal    Liver function Normal          Assessment and Nursing Intervention   Nursing Diagnosis Risk for unstable blood glucose pattern   Nursing Intervention Domain 5250 Decision-making Support   Nursing Interventions Examined current inpatient diabetes/blood glucose control   Explored factors facilitating and impeding inpatient management  Explored corrective strategies with patient and responsible inpatient provider   Informed patient of rational for insulin strategy while hospitalized     Nursing Diagnosis 94432 Ineffective Health Management   Nursing Intervention Domain 5250 Decision-making Support   Nursing Interventions Identified diabetes self-management practices impeding diabetes control  Discussed diabetes survival skills related to  Healthy Plate eating plan; given handouts  Role of physical activity in improving insulin sensitivity and action  Procedure for blood glucose monitoring & options for low-cost products  Medications plan at discharge     Billing Code(s)   [] 01165 IP subsequent hospital care - 50 minutes   [x] 70058 IP subsequent hospital care - 35 minutes   [] 81225 IP subsequent hospital care - 25 minutes   [] 88463 IP initial hospital care - 40 minutes     Before making these care recommendations, I personally reviewed the hospitalization record, including notes, laboratory & diagnostic data and current medications, and examined the patient at the bedside.  Total minutes: 35 minutes    ILDA Vargas - CNS   Diabetes Clinical Nurse Specialist   [facility]  Access via [employer]

## 2025-01-15 NOTE — PROGRESS NOTES
0700: Bedside shift change report given to MARIAM Leos (oncoming nurse) by MARIAM Mendoza (offgoing nurse). Report included the following information Nurse Handoff Report, Index, Intake/Output, MAR, and Recent Results.      0945: Diabetes management at bedside assessing patient. Will give patient 24 units of Lantus and then turn Insulin gtt off in 2 hours. Lantus given at this time.     1100: Dr. Ramirez at bedside assessing patient. Agrees with stopping Insulin gtt. Will monitor electrolytes and possible D/C tomorrow.     1130: Insulin gtt stopped at this time.     1850: Patient is asking if she can have something for her nasal congestion. Dr. Ramirez messaged at this time.     1900: End of Shift Note    Bedside shift change report given to MARIAM Mendoza (oncoming nurse) by Dafne Guerra RN (offgoing nurse).  Report included the following information SBAR, Kardex, Intake/Output, MAR, and Recent Results    Shift worked:  2419-9226     Shift summary and any significant changes:     Insuling gtt off, AC&HS. Nausea and vomiting has decreased.      Concerns for physician to address:  D/C planning?     Zone phone for oncoming shift:   XXX       Activity:  Level of Assistance: Independent  Number times ambulated in hallways past shift: 1  Number of times OOB to chair past shift: 3    Cardiac:   Cardiac Monitoring: Yes      Cardiac Rhythm: Sinus rhythm    Access:  Current line(s): PIV     Genitourinary:   Urinary Status: Voiding    Respiratory:   O2 Device: None (Room air)  Chronic home O2 use?: NO  Incentive spirometer at bedside: N/A    GI:  Last BM (including prior to admit): 01/14/25  Current diet:  ADULT DIET; Regular; 4 carb choices (60 gm/meal)  ADULT ORAL NUTRITION SUPPLEMENT; Breakfast, Dinner; Diabetic Oral Supplement  DIET ONE TIME MESSAGE;  Passing flatus: YES    Pain Management:   Patient states pain is manageable on current regimen: YES    Skin:  Milton Scale Score: 21  Interventions: Wound Offloading (Prevention

## 2025-01-15 NOTE — PROGRESS NOTES
Hospitalist Progress Note    NAME:   Phalen Shearouse   : 1985   MRN: [id number]     Date/Time: 1/15/2025 3:31 PM  Patient PCP: Lucia Oneill MD    Estimated discharge date: 25  Barriers: monitoring white count, monitoring blood sugar      Assessment / Plan:  DKA  Uncontrolled diabetes  Continue lantus and lispro sliding scale  Discontinue insulin drip      URI  Acute otitis media  S/p right TM rupture  Continue unasyn    Intractable nausea/vomiting  Resolved  Continue PPI  CT abdomen and pelvis negative      Hypertension  Hyperlipidemia  Medication non compliance  Patient reports not taking any prescribed medications reporting she did not like the way they made them feel so she stopped taking them  Previously prescribed: Metformin, glipizide, rosuvastatin, Januvia, Trulicity               Medical Decision Making:   I personally reviewed labs:  BMP: low potassium 3.4- will replete, low phosphorus 1.8- will replete  Cbc- white count elevated to 19- will monitor     I personally reviewed imaging:  I personally reviewed EKG:  Toxic drug monitoring: monitor for insulin toxicity  Monitor for hypoglycemia  Type II DM  -continue ISS  -monitor for hypoglycemia due to ISS with serial Accu- Checks  -Hypoglycemia protocol  -diabetic diet   Discussed case with: patient, RN        Code Status: full  DVT Prophylaxis: lovenox  GI Prophylaxis:    Subjective:     Chief Complaint / Reason for Physician Visit  Patient reports she feels better, denies nausea/vomiting.       Objective:     VITALS:   Last 24hrs VS reviewed since prior progress note. Most recent are:  Patient Vitals for the past 24 hrs:   BP Temp Temp src Pulse Resp SpO2 Weight   01/15/25 1515 (!) 152/90 97.4 °F (36.3 °C) Oral 88 23 98 % --   01/15/25 1103 131/83 98.3 °F (36.8 °C) Oral 88 17 97 % --   01/15/25 1055 129/82 98.8 °F (37.1 °C) Oral 88 26 98 % --   01/15/25 0724 114/79 98.5 °F (36.9 °C) Oral 97 20 98 % --   01/15/25 0600 -- -- -- -- -- --

## 2025-01-15 NOTE — PLAN OF CARE
Problem: Chronic Conditions and Co-morbidities  Goal: Patient's chronic conditions and co-morbidity symptoms are monitored and maintained or improved  Outcome: Progressing     Problem: Discharge Planning  Goal: Discharge to home or other facility with appropriate resources  Outcome: Progressing     Problem: Pain  Goal: Verbalizes/displays adequate comfort level or baseline comfort level  Recent Flowsheet Documentation  Taken 1/14/2025 1915 by Kelly Jacob RN  Verbalizes/displays adequate comfort level or baseline comfort level:   Encourage patient to monitor pain and request assistance   Assess pain using appropriate pain scale   Administer analgesics based on type and severity of pain and evaluate response   Implement non-pharmacological measures as appropriate and evaluate response   Consider cultural and social influences on pain and pain management   Notify Licensed Independent Practitioner if interventions unsuccessful or patient reports new pain

## 2025-01-15 NOTE — PROGRESS NOTES
Nursing contacted Nocturnist/cross cover provider via non-urgent messaging system [employer] and notified patient having h/a, declines the tylenol prn as ordered, asking for something else. No other concerns reported. No acute distress reported. No other information provided by nurse. VSS.     Ordered toradol 15mg iv x1- nurse to apply ice to forehead and neck, nurse may give tylenol prn next if remains pain s/p toradol and nurse to d/w dayshift md further this am mgmt/RECs. Will defer further evaluation/management to the day shift primary attending care team. Patient denies any further complaints or concerns.     Nursing to notify Hospitalist for further/continued concerns. Will remain available overnight for further concerns if nursing/patient needs. Please note, there are RRT systems in this hospital in place that if nursing has acute or critical patient condition change or concern, this is to help facilitate and notify that patient needs immediate bedside evaluation by a provider.     Non-billable note.

## 2025-01-16 VITALS
RESPIRATION RATE: 22 BRPM | TEMPERATURE: 98.3 F | BODY MASS INDEX: 27.02 KG/M2 | WEIGHT: 134.04 LBS | OXYGEN SATURATION: 98 % | DIASTOLIC BLOOD PRESSURE: 86 MMHG | HEIGHT: 59 IN | SYSTOLIC BLOOD PRESSURE: 138 MMHG | HEART RATE: 107 BPM

## 2025-01-16 LAB
ANION GAP SERPL CALC-SCNC: 8 MMOL/L (ref 2–12)
BASOPHILS # BLD: 0 K/UL (ref 0–0.1)
BASOPHILS NFR BLD: 0 % (ref 0–1)
BUN SERPL-MCNC: 3 MG/DL (ref 6–20)
BUN/CREAT SERPL: 9 (ref 12–20)
CALCIUM SERPL-MCNC: 8.6 MG/DL (ref 8.5–10.1)
CHLORIDE SERPL-SCNC: 106 MMOL/L (ref 97–108)
CO2 SERPL-SCNC: 22 MMOL/L (ref 21–32)
CREAT SERPL-MCNC: 0.35 MG/DL (ref 0.55–1.02)
DIFFERENTIAL METHOD BLD: ABNORMAL
EOSINOPHIL # BLD: 0.71 K/UL (ref 0–0.4)
EOSINOPHIL NFR BLD: 5 % (ref 0–7)
ERYTHROCYTE [DISTWIDTH] IN BLOOD BY AUTOMATED COUNT: 13.2 % (ref 11.5–14.5)
GLUCOSE BLD STRIP.AUTO-MCNC: 195 MG/DL (ref 65–117)
GLUCOSE BLD STRIP.AUTO-MCNC: 198 MG/DL (ref 65–117)
GLUCOSE SERPL-MCNC: 221 MG/DL (ref 65–100)
HCT VFR BLD AUTO: 30.8 % (ref 35–47)
HGB BLD-MCNC: 10.3 G/DL (ref 11.5–16)
IMM GRANULOCYTES # BLD AUTO: 0 K/UL (ref 0–0.04)
IMM GRANULOCYTES NFR BLD AUTO: 0 % (ref 0–0.5)
LYMPHOCYTES # BLD: 1.13 K/UL (ref 0.8–3.5)
LYMPHOCYTES NFR BLD: 8 % (ref 12–49)
MAGNESIUM SERPL-MCNC: 1.8 MG/DL (ref 1.6–2.4)
MCH RBC QN AUTO: 26.8 PG (ref 26–34)
MCHC RBC AUTO-ENTMCNC: 33.4 G/DL (ref 30–36.5)
MCV RBC AUTO: 80.2 FL (ref 80–99)
METAMYELOCYTES NFR BLD MANUAL: 3 %
MONOCYTES # BLD: 1.55 K/UL (ref 0–1)
MONOCYTES NFR BLD: 11 % (ref 5–13)
MYELOCYTES NFR BLD MANUAL: 1 %
NEUTS BAND NFR BLD MANUAL: 1 %
NEUTS SEG # BLD: 10.15 K/UL (ref 1.8–8)
NEUTS SEG NFR BLD: 71 % (ref 32–75)
NRBC # BLD: 0 K/UL (ref 0–0.01)
NRBC BLD-RTO: 0 PER 100 WBC
PHOSPHATE SERPL-MCNC: 2.7 MG/DL (ref 2.6–4.7)
PLATELET # BLD AUTO: 382 K/UL (ref 150–400)
PMV BLD AUTO: 9.4 FL (ref 8.9–12.9)
POTASSIUM SERPL-SCNC: 4.1 MMOL/L (ref 3.5–5.1)
RBC # BLD AUTO: 3.84 M/UL (ref 3.8–5.2)
RBC MORPH BLD: ABNORMAL
SERVICE CMNT-IMP: ABNORMAL
SERVICE CMNT-IMP: ABNORMAL
SODIUM SERPL-SCNC: 136 MMOL/L (ref 136–145)
WBC # BLD AUTO: 14.1 K/UL (ref 3.6–11)

## 2025-01-16 PROCEDURE — 2580000003 HC RX 258: Performed by: STUDENT IN AN ORGANIZED HEALTH CARE EDUCATION/TRAINING PROGRAM

## 2025-01-16 PROCEDURE — 99231 SBSQ HOSP IP/OBS SF/LOW 25: CPT

## 2025-01-16 PROCEDURE — 36415 COLL VENOUS BLD VENIPUNCTURE: CPT

## 2025-01-16 PROCEDURE — 2500000003 HC RX 250 WO HCPCS: Performed by: STUDENT IN AN ORGANIZED HEALTH CARE EDUCATION/TRAINING PROGRAM

## 2025-01-16 PROCEDURE — 83735 ASSAY OF MAGNESIUM: CPT

## 2025-01-16 PROCEDURE — 6360000002 HC RX W HCPCS: Performed by: STUDENT IN AN ORGANIZED HEALTH CARE EDUCATION/TRAINING PROGRAM

## 2025-01-16 PROCEDURE — 82962 GLUCOSE BLOOD TEST: CPT

## 2025-01-16 PROCEDURE — 80048 BASIC METABOLIC PNL TOTAL CA: CPT

## 2025-01-16 PROCEDURE — 85025 COMPLETE CBC W/AUTO DIFF WBC: CPT

## 2025-01-16 PROCEDURE — 84100 ASSAY OF PHOSPHORUS: CPT

## 2025-01-16 PROCEDURE — 6370000000 HC RX 637 (ALT 250 FOR IP)

## 2025-01-16 PROCEDURE — 6370000000 HC RX 637 (ALT 250 FOR IP): Performed by: STUDENT IN AN ORGANIZED HEALTH CARE EDUCATION/TRAINING PROGRAM

## 2025-01-16 RX ORDER — PEN NEEDLE, DIABETIC 31 GX5/16"
1 NEEDLE, DISPOSABLE MISCELLANEOUS 3 TIMES DAILY
Qty: 100 EACH | Refills: 2 | Status: SHIPPED | OUTPATIENT
Start: 2025-01-16 | End: 2025-04-16

## 2025-01-16 RX ORDER — BLOOD-GLUCOSE METER
1 KIT MISCELLANEOUS DAILY
Qty: 1 KIT | Refills: 0 | Status: SHIPPED | OUTPATIENT
Start: 2025-01-16

## 2025-01-16 RX ORDER — INSULIN LISPRO 100 [IU]/ML
4 INJECTION, SOLUTION INTRAVENOUS; SUBCUTANEOUS
Qty: 15 ML | Refills: 1 | Status: SHIPPED | OUTPATIENT
Start: 2025-01-16

## 2025-01-16 RX ORDER — FLUTICASONE PROPIONATE 50 MCG
1 SPRAY, SUSPENSION (ML) NASAL DAILY PRN
Qty: 16 G | Refills: 0 | Status: SHIPPED | OUTPATIENT
Start: 2025-01-16

## 2025-01-16 RX ORDER — FAMOTIDINE 20 MG/1
20 TABLET, FILM COATED ORAL 2 TIMES DAILY
Qty: 60 TABLET | Refills: 0 | Status: SHIPPED | OUTPATIENT
Start: 2025-01-16

## 2025-01-16 RX ORDER — INSULIN LISPRO 100 [IU]/ML
4 INJECTION, SOLUTION INTRAVENOUS; SUBCUTANEOUS
Status: DISCONTINUED | OUTPATIENT
Start: 2025-01-16 | End: 2025-01-16 | Stop reason: HOSPADM

## 2025-01-16 RX ORDER — LANCETS 30 GAUGE
1 EACH MISCELLANEOUS DAILY
Qty: 100 EACH | Refills: 5 | Status: SHIPPED | OUTPATIENT
Start: 2025-01-16

## 2025-01-16 RX ORDER — GLUCOSAMINE HCL/CHONDROITIN SU 500-400 MG
1 CAPSULE ORAL 3 TIMES DAILY
Qty: 100 STRIP | Refills: 3 | Status: SHIPPED | OUTPATIENT
Start: 2025-01-16 | End: 2025-05-29

## 2025-01-16 RX ORDER — INSULIN GLARGINE 100 [IU]/ML
24 INJECTION, SOLUTION SUBCUTANEOUS DAILY
Qty: 5 ADJUSTABLE DOSE PRE-FILLED PEN SYRINGE | Refills: 3 | Status: SHIPPED | OUTPATIENT
Start: 2025-01-16

## 2025-01-16 RX ADMIN — INSULIN LISPRO 2 UNITS: 100 INJECTION, SOLUTION INTRAVENOUS; SUBCUTANEOUS at 12:22

## 2025-01-16 RX ADMIN — INSULIN LISPRO 2 UNITS: 100 INJECTION, SOLUTION INTRAVENOUS; SUBCUTANEOUS at 09:22

## 2025-01-16 RX ADMIN — FAMOTIDINE 20 MG: 20 TABLET, FILM COATED ORAL at 09:21

## 2025-01-16 RX ADMIN — INSULIN LISPRO 4 UNITS: 100 INJECTION, SOLUTION INTRAVENOUS; SUBCUTANEOUS at 12:22

## 2025-01-16 RX ADMIN — SODIUM CHLORIDE, PRESERVATIVE FREE 10 ML: 5 INJECTION INTRAVENOUS at 09:22

## 2025-01-16 RX ADMIN — AMPICILLIN SODIUM AND SULBACTAM SODIUM 3000 MG: 2; 1 INJECTION, POWDER, FOR SOLUTION INTRAMUSCULAR; INTRAVENOUS at 09:43

## 2025-01-16 RX ADMIN — INSULIN GLARGINE 24 UNITS: 100 INJECTION, SOLUTION SUBCUTANEOUS at 09:22

## 2025-01-16 RX ADMIN — ENOXAPARIN SODIUM 40 MG: 100 INJECTION SUBCUTANEOUS at 09:21

## 2025-01-16 NOTE — PROGRESS NOTES
Physician Progress Note      PATIENT:               SHEAROUSE, PHALEN  CSN #:                  [id number]  :                       1985  ADMIT DATE:       2025 7:25 PM  DISCH DATE:  RESPONDING  PROVIDER #:        ELIZABETH SCHAEFER          QUERY TEXT:    Patient admitted with DKA. Noted to have \"Greater than 2% weight loss over 1   week\" per RD consult dated . If possible, please document in progress   notes and discharge summary if you are evaluating and /or treating any of the   following:    The medical record reflects the following:  Risk Factors: C/o HA, Intractable N/V x 1 week with interval development of   right ear pain  Clinical Indicators: RD noted patient meets the following ASPEN criteria:    Malnutrition Status:  Moderate malnutrition (25 1027)  Context:  Acute Illness  Findings of the 6 clinical characteristics of malnutrition:  Energy Intake:  75% or less of estimated energy requirements for 7 or more   days  Weight Loss:  Greater than 2% over 1 week  Body Fat Loss:  No body fat loss  Muscle Mass Loss:  No muscle mass loss  Fluid Accumulation:  No fluid accumulation   Strength:  Not Performed    Treatment: RD consult; Nutrition Recommendations/Plan: Continue   carb-controlled diet; Increase Glucerna to BID    Thank you,    Katlin Dominguez  CDI  [email or web address]    ASPEN Criteria:    https://aspenjournals.onlinelibrary.lorenzo.com/doi/full/10.1177/656815197302315  5  Options provided:  -- Protein calorie malnutrition moderate  -- Other - I will add my own diagnosis  -- Disagree - Not applicable / Not valid  -- Disagree - Clinically unable to determine / Unknown  -- Refer to Clinical Documentation Reviewer    PROVIDER RESPONSE TEXT:    Provider is clinically unable to determine a response to this query.  please refer your question to the yury barone, i was only on call overnight   and was not the admitting or rouding attending provider. thank you    Query created by:  Katlin Dominguez on 1/15/2025 12:58 PM      Electronically signed by:  ELIZABETH SCHAEFER 1/15/2025 7:04 PM

## 2025-01-16 NOTE — PROGRESS NOTES
Bedside and Verbal shift change report given to Kelly RN (oncoming nurse) by Dafne BECERRA (offgoing nurse). Report included the following information Nurse Handoff Report.       End of Shift Note    Bedside shift change report given to Roney RN (oncoming nurse) by Kelly Jacob RN (offgoing nurse).  Report included the following information SBAR    Shift worked:  7p-7a     Shift summary and any significant changes:     Pt educated on the need for ent out pt. And got pk replacement.     Concerns for physician to address:  See above     Zone phone for oncoming shift:          Activity:  Level of Assistance: Independent  Number times ambulated in hallways past shift: 0  Number of times OOB to chair past shift: 0    Cardiac:   Cardiac Monitoring: Yes      Cardiac Rhythm: Sinus rhythm    Access:  Current line(s): PIV     Genitourinary:   Urinary Status: Voiding    Respiratory:   O2 Device: None (Room air)  Chronic home O2 use?: YES  Incentive spirometer at bedside: NO    GI:  Last BM (including prior to admit): 01/15/25  Current diet:  ADULT DIET; Regular; 4 carb choices (60 gm/meal)  ADULT ORAL NUTRITION SUPPLEMENT; Breakfast, Dinner; Diabetic Oral Supplement  DIET ONE TIME MESSAGE;  Passing flatus: YES    Pain Management:   Patient states pain is manageable on current regimen: YES    Skin:  Milton Scale Score: 21  Interventions: Wound Offloading (Prevention Methods): Adaptive equipment, Bed, pressure redistribution/air, Pillows, Repositioning, Walker    Patient Safety:  Fall Risk:    Fall Risk Interventions  Toilet Every 2 Hours-In Advance of Need: Yes  Hourly Visual Checks: Awake, In bed  Fall Visual Posted: Armband  Room Door Open: Deferred to decrease stimulation  Alarm On: Bed  Patient Moved Closer to Nursing Station: No    Active Consults:   IP CONSULT TO DIABETES EDUCATOR    Length of Stay:  Expected LOS: 3  Actual LOS: 3    Kelly Jacob, RN

## 2025-01-16 NOTE — PROGRESS NOTES
.DISCHARGE NOTE FROM SURGICAL-TELEMETRY NURSE    Patient determined to be stable for discharge by attending provider. I have reviewed the discharge instructions and follow-up appointments with the Patient. They verbalized understanding and all questions were answered to their satisfaction. No complaints or further questions were expressed.      Medications sent to pharmacy Appropriate educational materials and medication side effect teaching were provided.      PIV were removed prior to discharge.     Patient did not discharge with any line, shetty, or drain.    All personal items collected during admission were returned to the patient prior to discharge.    Post-op patient: Cadence David RN

## 2025-01-16 NOTE — CARE COORDINATION
Clear from CM standpoint      Transition of Care Plan:    RUR: 11% Low RUR  Prior Level of Functioning: Independent in ADLs/IADLs   Disposition: Home with follow up  DONTRELL: 1/16  If SNF or IPR: Date FOC offered: na  Date FOC received:   Accepting facility:   Date authorization started with reference number:   Date authorization received and expires:   Follow up appointments: pcp/specialist   DME needed: none  Transportation at discharge:   IM/IMM Medicare/ letter given: na  Is patient a [place] and connected with VA? na   If yes, was  transfer form completed and VA notified? na  Caregiver Contact: Butch Laughlin (Parent) [phone]  Discharge Caregiver contacted prior to discharge? contacted  Care Conference needed? no  Barriers to discharge: none    The  (CM) noted no futher needs or concerns. The patient agrees with the discharge plan. She reported that she will take her prescribed medications and follow the recommendation. The patient's family will provide  transportation upon discharge. The patient's care coordinator with [facility] was updated.  The primary registered nurse (RN) has been informed that the patient is cleared from the case management perspective.       01/16/25 1123   Services At/After Discharge   Transition of Care Consult (CM Consult) Discharge Planning   Services At/After Discharge None    Resource Information Provided? No   Mode of Transport at Discharge Other (see comment)   Confirm Follow Up Transport Self         The patient is alert, able to communicate  needs effectively,  independent in ADLS/IADLS, and able to drive. The patient resides with her family  in a one level and does not use any DMEs. The patient emergency contact is Butch Laughlin (Parent) [phone] and denies any needs at this time.         Amarjit Dorsey, MARIAM  Case Management  [phone]

## 2025-01-16 NOTE — CARE COORDINATION
Refused for CM/CMA to arrange a pcp appointment and requested to make it herself.    Amarjit Dorsey, RN  Case Management  [phone]

## 2025-01-16 NOTE — PLAN OF CARE
Problem: Chronic Conditions and Co-morbidities  Goal: Patient's chronic conditions and co-morbidity symptoms are monitored and maintained or improved  Outcome: Progressing     Problem: Discharge Planning  Goal: Discharge to home or other facility with appropriate resources  Outcome: Progressing     Problem: Nutrition Deficit:  Goal: Optimize nutritional status  Outcome: Progressing

## 2025-01-16 NOTE — DISCHARGE SUMMARY
Discharge Summary    Name: Phalen Shearouse  [id number]  YOB: 1985 (Age: 39 y.o.)   Date of Admission: 1/13/2025  Date of Discharge: 1/16/2025  Attending Physician: Yadira Ramirez MD    Discharge Diagnosis:   DKA  Uncontrolled diabetes mellitus  URI  Acute otitis media  Intractable nausea/vomiting  Hypertension  Hyperlipidemia  Medication non compliance    Consultations:  IP CONSULT TO DIABETES EDUCATOR      Brief Admission History/Reason for Admission Per Brian Whitman, DO:   Phalen Shearouse is a 39 y.o.  female with PMHx as listed below presenting to the emergency department via EMS with complaints of headache with intractable nausea and vomiting present for 1 week with interval development of right ear pain followed by bleeding from right ear with resolution of pain.  Reports URI symptoms for the past week and has noted bloody/purulent mucus on blowing her nose.  Denies fever/chills/shakes.  Reports she has also been struggling with nausea and vomiting for the past week and was previously seen in the ED on 1/7 with unremarkable workup with exception of slightly elevated blood glucose.  Denies abdominal pain.  Reports pertinent past medical history of diabetes mellitus without history of DKA.  Reports she takes no prescription medications stating she does not like how they make her feel.  Works as a teacher at [employer]-reports she is unaware of any GI bugs, but \"something is always running through that place.\"     Brief Hospital Course by Main Problems:   DKA- resolved  Uncontrolled diabetes  Continue lantus and lispro sliding scale  S/p  insulin drip  Appreciate diabetes management team consult        URI  Acute otitis media  S/p right TM rupture  Was treated with unasyn- now transitioned to augmentin  White count trending down. Is tachycardic- patient states that she has chronic tachycardia- reviewed past ED vitals which was notable for

## 2025-01-16 NOTE — DISCHARGE INSTRUCTIONS
Please return to ED if you have worsening ear pain, fever, nausea/vomiting or any worsening symptoms.  Please follow up with PCP within 1 week and have labs checked.  Please follow up with ENT.

## 2025-01-16 NOTE — DIABETES MGMT
[facility]  DIABETES MANAGEMENT CONSULT    Consulted by Provider for advanced nursing evaluation and care for inpatient blood glucose management.    Evaluation and Action Plan   Phalen Shearouse is a 39 year old female patient with a hx of Type 2 diabetes. The patient is admitted after experiencing n/v and headache. The patient is noted to have sought treatment in the ER the previous week for similar symptoms. Upon admission the patient's Bg's were significantly elevated with an elevated anion gap. DKA. The patient was started on the insulin infusion. Her anion gap is now closed x 3. She can likely transition off the insulin infusion this morning, once insulin requirements have lessen. Once using less than 3 units insulin per hour consider transition.   Patient anticipated to tranistion off the insulin now is using less than 3 units insulin per hour on the infusion. The patient reports feeling much better. Insulin dosing ordered.   BG's improved today. In anticipation of discharge I recommend using 24 units Lantus daily and Humalog 4 units with each meal. The patient is amenable. She reports knowing how to administer insulin with insulin. Survival skills discussed. The patient would likely benefit from outpatient diabetes management.     Blood glucose pattern            Diabetes Discharge Plan   Medication  1. use 24 units Lantus daily  2. Use Humalog 4 units with each meal  3. Monitor Bg's   Follow-up with PCP for future diabetes management     Referral  [x]        Outpatient diabetes education   Additional orders            Initial Presentation   Phalen Shearouse is a 39 y.o. female who presented to the ED on 01/13/25  LAB:Glucose 423. Anion gap 20. Co2 12. A1c >14%  CXR:Narrative & Impression  EXAM: XR CHEST 1 VIEW     INDICATION: n/v, elevated WBC     COMPARISON: None.     FINDINGS: A portable AP radiograph of the chest was obtained at 2039 hours.  Cardiac monitoring leads. The  History   Type Diabetes- Hx of Type 2 DM  Ambulatory BG management provided by: PCP  Patient reportedly prescribed insulin in past however patient does not take it.     Lab Results   Component Value Date    LABA1C >14.0 (H) 01/14/2025    LABA1C 13.4 (H) 01/14/2025    LABA1C 12.5 (H) 03/23/2023     Diabetes-related Medical History  Acute complications  DKA      Diabetes Medication History  Diabetes drug class Diabetes drug name Additional Comments   Biguanide  metformin    Sulfonylureas glipizide    DPP4 inhibitors Sitagliptin ( Januvia)    GLP-1 Floridalma Trulicity      Diabetes self-management practices:   Eating pattern   [x] Not eating a carbohydrate-controlled meal plan    Physical activity pattern   [x]  Not employing a physical activity program to control BG    Monitoring pattern   [x]  Not testing BGs sufficiently to inform self-management adjustments      Taking medications pattern  [x]  Inconsistent administration    Reducing risks  [x] Influenza:   10/21/2023     Social determinants of health impacting diabetes self-management practices    Concerned that you need to know more about how to stay healthy with diabetes    Overall evaluation:    [x] Not achieving A1c target with drug therapy & self-care practices    Subjective   ”I will take it\"      Objective   Physical exam  General Overweight  female in no acute distress. Conversant and cooperative  Neuro  Alert, oriented   Vital Signs   Vitals:    01/16/25 1127   BP:    Pulse:    Resp:    Temp: 98.3 °F (36.8 °C)   SpO2:        Laboratory  Recent Labs     01/13/25  2013 01/14/25  0456 01/16/25  0928   WBC 18.0* 19.0* 14.1*   HGB 13.4 10.2* 10.3*   HCT 40.4 31.0* 30.8*   MCV 80.5 79.9* 80.2   * 352 382     Recent Labs     01/15/25  0516 01/15/25  0832 01/16/25  0928    139 136   K 3.6 3.4* 4.1   * 112* 106   CO2 19* 20* 22   PHOS 1.7* 1.8* 2.7   BUN 4* 4* 3*   CREATININE 0.38* 0.41* 0.35*     Lab Results   Component Value Date    ALT 14

## 2025-01-16 NOTE — PLAN OF CARE
Problem: Chronic Conditions and Co-morbidities  Goal: Patient's chronic conditions and co-morbidity symptoms are monitored and maintained or improved  1/16/2025 1453 by Roney David RN  Outcome: Progressing  1/16/2025 1108 by Roney David RN  Outcome: Progressing     Problem: Discharge Planning  Goal: Discharge to home or other facility with appropriate resources  1/16/2025 1453 by Roney David RN  Outcome: Progressing  1/16/2025 1108 by Roney David RN  Outcome: Progressing     Problem: Nutrition Deficit:  Goal: Optimize nutritional status  1/16/2025 1453 by Roney David RN  Outcome: Progressing  1/16/2025 1108 by Roney David RN  Outcome: Progressing     Problem: Pain  Goal: Verbalizes/displays adequate comfort level or baseline comfort level  1/16/2025 1453 by Roney David RN  Outcome: Progressing  1/16/2025 1108 by Roney David RN  Outcome: Progressing

## 2025-01-17 PROBLEM — E78.5 HYPERLIPIDEMIA ASSOCIATED WITH TYPE 2 DIABETES MELLITUS (HCC): Status: ACTIVE | Noted: 2023-03-28

## 2025-01-17 PROBLEM — E11.69 TYPE 2 DIABETES MELLITUS WITH OTHER SPECIFIED COMPLICATION (HCC): Status: ACTIVE | Noted: 2023-03-28

## 2025-01-17 NOTE — PROGRESS NOTES
Physician Progress Note      PATIENT:               SHEAROUSE, PHALEN  CSN #:                  [id number]  :                       1985  ADMIT DATE:       2025 7:25 PM  DISCH DATE:        2025 2:56 PM  RESPONDING  PROVIDER #:        Yadira Ramirez MD          QUERY TEXT:    Patient admitted with DKA. Noted to have \"Greater than 2% weight loss over 1   week\" per RD consult dated . If possible, please document in progress   notes and discharge summary if you are evaluating and /or treating any of the   following:    The medical record reflects the following:  Risk Factors: C/o HA, Intractable N/V x 1 week with interval development of   right ear pain  Clinical Indicators: RD noted patient meets the following ASPEN criteria:    Malnutrition Status:  Moderate malnutrition (25 1027)  Context:  Acute Illness  Findings of the 6 clinical characteristics of malnutrition:  Energy Intake:  75% or less of estimated energy requirements for 7 or more   days  Weight Loss:  Greater than 2% over 1 week  Body Fat Loss:  No body fat loss  Muscle Mass Loss:  No muscle mass loss  Fluid Accumulation:  No fluid accumulation   Strength:  Not Performed    Treatment: RD consult; Nutrition Recommendations/Plan: Continue   carb-controlled diet; Increase Glucerna to BID    Thank you,    Katlin Dominguez  CDI  [email or web address]    ASPEN Criteria:    https://aspenjournals.onlinelibrary.lorenzo.com/doi/full/10.1177/160964557284835  5  Options provided:  -- Protein calorie malnutrition moderate  -- Other - I will add my own diagnosis  -- Disagree - Not applicable / Not valid  -- Disagree - Clinically unable to determine / Unknown  -- Refer to Clinical Documentation Reviewer    PROVIDER RESPONSE TEXT:    This patient has moderate protein calorie malnutrition.    Query created by: Katlin Dominguez on 2025 3:45 PM      Electronically signed by:  Yadira Ramirez MD 2025 5:13 PM

## 2025-02-06 ENCOUNTER — OFFICE VISIT (OUTPATIENT)
Age: 40
End: 2025-02-06

## 2025-02-06 DIAGNOSIS — E11.65 TYPE 2 DIABETES MELLITUS WITH HYPERGLYCEMIA, WITHOUT LONG-TERM CURRENT USE OF INSULIN (HCC): Primary | ICD-10-CM

## 2025-02-06 NOTE — PROGRESS NOTES
[facility]  Diabetes Self-Management Education & Support Program    Reason for Referral: T2DM  Referral Source: April Maloney, ILDA CONDE*  Services requested: DSMES       ASSESSMENT    From my perspective, the participant would benefit from DSMES specifically related to reducing risks, healthy eating, monitoring, taking medications, physical activity, healthy coping, and problem solving. Will adapt DSMES program to build on participant's skills score, confidence score, and preparedness score as noted in the Diabetes Skills, Confidence, and Preparedness Index.    During the program, we will focus on providing DSMES that specifically addresses participant's interest in reducing risks, healthy eating, monitoring, taking medications, physical activity, healthy coping, and problem solving, as shown by their reported readiness to change.    The participant would be best served by attending weekly group class series.    Diabetes Self-Management Education Follow-up Visit: 3/5/25       Clinical Presentation  Phalen Shearouse is a 39 y.o. White female referred for diabetes self-management education. Participant has Type 2 DM on insulin for 1-10 years. Family history positive for diabetes.     Patient reports not receiving DSMES services in the past.     Most recent A1c value:   Lab Results   Component Value Date/Time    LABA1C >14.0 01/14/2025 02:04 PM       Diabetes-related medical history:  Acute complications  DKA    Diabetes-related medications:  Current dosing: insulin lispro (1 Unit Dial) Sopn - 100 UNIT/ML  Lantus SoloStar Sopn - 100 UNIT/ML  metFORMIN - 500 MG    Blood Pressure Management  This patient does not have an active medication from one of the medication groupers.      Lipid Management  This patient does not have an active medication from one of the medication groupers.      Clot Prevention  This patient does not have an active medication from one of the medication